# Patient Record
Sex: MALE | Race: WHITE | NOT HISPANIC OR LATINO | Employment: STUDENT
[De-identification: names, ages, dates, MRNs, and addresses within clinical notes are randomized per-mention and may not be internally consistent; named-entity substitution may affect disease eponyms.]

---
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
## 2021-04-12 ENCOUNTER — TELEPHONE (OUTPATIENT)
Dept: FAMILY MEDICINE | Facility: CLINIC | Age: 21
End: 2021-04-12

## 2021-04-12 NOTE — TELEPHONE ENCOUNTER
Patient Quality Outreach Summary      Summary:    Patient is due/failing the following:   Physical and Depression follow up     Type of outreach:    Phone, left message for patient/parent to call back.    Questions for provider review:    None                                                                                                                    Rosa Maria Mallory CMA     Chart routed to Care Team.

## 2021-04-22 ENCOUNTER — OFFICE VISIT (OUTPATIENT)
Dept: ORTHOPEDICS | Facility: CLINIC | Age: 21
End: 2021-04-22
Payer: COMMERCIAL

## 2021-04-22 VITALS — HEIGHT: 75 IN | WEIGHT: 240 LBS | BODY MASS INDEX: 29.84 KG/M2

## 2021-04-22 DIAGNOSIS — S69.91XA INJURY OF FINGER OF RIGHT HAND, INITIAL ENCOUNTER: Primary | ICD-10-CM

## 2021-04-22 ASSESSMENT — MIFFLIN-ST. JEOR: SCORE: 2184.26

## 2021-04-22 NOTE — LETTER
4/22/2021      RE: Jose Jenkins  [address] [facility] [address]       CHIEF CONCERN:   Chief Complaint   Patient presents with     Finger     R Index        HISTORY:   Jose Jenkins is a 21 year old male who was seen today on the football feild for evaluation of right index finger pain. Pt states that he was hit directly on the dorsal aspect of the right index finger MCP joint. No injury or pain prior to this. States he had acute onset of pain. No numbness or tingling. No pain in any other fingers.       PAST MEDICAL HISTORY: (Reviewed with the patient and in the [facility] medical record)  Past Medical History:   Diagnosis Date     NO ACTIVE PROBLEMS        PAST SURGICAL HISTORY: (Reviewed with the patient and in the EPIC medical record)  Past Surgical History:   Procedure Laterality Date     EXTRACTION(S) DENTAL  2019     NO HISTORY OF SURGERY         MEDICATIONS: (Reviewed with the patient and in the [facility] medical record)  Notable medications include:  Current Outpatient Medications   Medication Sig Dispense Refill     meloxicam (MOBIC) 15 MG tablet TAKE 1 TABLET BY MOUTH EVERY DAY 21 tablet 0     sertraline (ZOLOFT) 50 MG tablet Take 1.5 tablets (75 mg) by mouth daily 45 tablet 1     sertraline (ZOLOFT) 50 MG tablet TAKE 1/2 TABLET BY MOUTH EVERY MORNING FOR 5 DAYS THEN 1 TABLET DAILY THEREAFTER 30 tablet 0        ALLERGIES: (Reviewed with the patient and in the [facility] medical record)  No Known Allergies    SOCIAL HISTORY: (Reviewed with the patient and in the medical record)  --Tobacco: no  --Occupation: student  --Avocation/Sport: [employer]    FAMILY HISTORY: (Reviewed with the patient and in the medical record)  -- No family history of bleeding, clotting, or difficulty with anesthesia    REVIEW OF SYSTEMS: (Reviewed with the patient and on the health intake form)  -- A comprehensive 10 point review of systems was conducted and is negative except as noted in the HPI    EXAM:   General: Awake,  "Alert and Oriented, No acute Distress. Articulate and Interactive  Ht 1.905 m (6' 3\")   Wt 108.9 kg (240 lb)   BMI 30.00 kg/m     Right upper extremity and shoulder exam:    Skin is Warm and Well perfused, no suggestion of infection, no previous incisions    No effusion. Tenderness to palpation at the right MCP joint primarily, less so in PIP or DIP joint    Able to still flex and extend at MCP, DIP, PIP but with pain    Sensation and motor intact to axillary/radial/ulnar/median distributions; no scapular winging or dyskinesia     Brisk distal capillary refill    IMAGING:  None    ASSESSMENT:  1. 21 year old male with right index finger pain    PLAN:  1. Will obtain right hand xray for further evaluation  2. Plan to be determined after viewing xrays    Sree Davis MD  Fellow, Sports Medicine & Shoulder  [facility] Orthopaedic Surgery        Patient seen and examined with the resident.     Assesment: right index finger injury    Plan: xray, will review and results, play when able    I agree with history, physical and imaging as well as the assessment and plan as detailed by Dr. Lakhani.         Woody Ramos MD    "

## 2021-04-23 ENCOUNTER — ANCILLARY PROCEDURE (OUTPATIENT)
Dept: GENERAL RADIOLOGY | Facility: CLINIC | Age: 21
End: 2021-04-23
Attending: ORTHOPAEDIC SURGERY
Payer: COMMERCIAL

## 2021-04-23 ENCOUNTER — OFFICE VISIT (OUTPATIENT)
Dept: ORTHOPEDICS | Facility: CLINIC | Age: 21
End: 2021-04-23
Payer: COMMERCIAL

## 2021-04-23 VITALS — BODY MASS INDEX: 29.84 KG/M2 | HEIGHT: 75 IN | WEIGHT: 240 LBS

## 2021-04-23 DIAGNOSIS — S69.91XA INJURY OF FINGER OF RIGHT HAND, INITIAL ENCOUNTER: Primary | ICD-10-CM

## 2021-04-23 DIAGNOSIS — S69.91XA INJURY OF FINGER OF RIGHT HAND, INITIAL ENCOUNTER: ICD-10-CM

## 2021-04-23 PROCEDURE — 99203 OFFICE O/P NEW LOW 30 MIN: CPT | Mod: 25 | Performed by: FAMILY MEDICINE

## 2021-04-23 PROCEDURE — 73130 X-RAY EXAM OF HAND: CPT | Mod: RT | Performed by: RADIOLOGY

## 2021-04-23 PROCEDURE — 29075 APPL CST ELBW FNGR SHORT ARM: CPT | Mod: RT | Performed by: FAMILY MEDICINE

## 2021-04-23 ASSESSMENT — MIFFLIN-ST. JEOR: SCORE: 2184.26

## 2021-04-23 NOTE — PROGRESS NOTES
Patient seen and examined with the resident.     Assesment: right index finger injury    Plan: xray, will review and results, play when able    I agree with history, physical and imaging as well as the assessment and plan as detailed by Dr. Lakhani.

## 2021-04-23 NOTE — LETTER
"    4/23/2021         RE: Jose Jenkins  [address] [facility] [address]        Dear Colleague,    Thank you for referring your patient, Jose Jenkins, to the [facility]TH[facility]. Please see a copy of my visit note below.      [facility]  SPORTS & ORTHOPEDIC CLINIC VISIT     Apr 23, 2021        ASSESSMENT & PLAN    21-year-old with spiral fracture of proximal phalanx of index finger    Reviewed imaging and assessment with patient in detail  Patient was placed in ulnar gutter cast.  We will follow-up at [facility] as previously planned    Kelvin Diaz MD  [facility]THOP[facility]    -----  Chief Complaint   Patient presents with     Consult     right hand       SUBJECTIVE  Jose Jenkins is a/an 21 year old male who is seen for follow up of from right index finger fracture. He was seen by Dr. Ramos in the athletic training room at [facility].     The patient is seen by themselves.    Date of injury: 4/22/2021  Date of Last Visit: 4/22/2021   Symptoms: no change  Worsened by: movement  Better with: nothing.  Treatment to date: no treatment tried to date  Associated symptoms: swelling and pain      REVIEW OF SYSTEMS:    See HPI     OBJECTIVE:  Ht 1.905 m (6' 3\")   Wt 108.9 kg (240 lb)   BMI 30.00 kg/m       Alert, pleasant, no distress  Right index finger: Significant soft tissue swelling.  No significant deformity.  Limited ROM due to swelling.  Sensation intact.  Strength intact.    RADIOLOGY:    Reviewed x-rays of right index finger.  Per radiology report:IMPRESSION: Acute extra-articular slightly foreshortened index finger  proximal phalangeal spiral fracture.           Cast/splint application    Date/Time: 4/23/2021 4:37 PM  Performed by: Asya Bonilla ATC  Authorized by: Kelvin Diaz MD     Consent:     Consent obtained:  Verbal    Consent given by:  Patient ( and ATC GA )    Risks " discussed:  Discoloration, numbness, pain and swelling    Alternatives discussed:  No treatment  Pre-procedure details:     Sensation:  Normal  Procedure details:     Laterality:  Right    Location:  Finger    Finger:  R index finger    Cast type:  Ulnar gutter (4 finger in intrinsic plus)    Supplies:  Fiberglass  Post-procedure details:     Pain:  Unchanged    Pain level:  0/10    Sensation:  Normal    Patient tolerance of procedure:  Tolerated well, no immediate complications    Patient provided with cast or splint care instructions: Yes        Asya Bonilla, ATC

## 2021-04-23 NOTE — PROGRESS NOTES
"  [facility] SURG[facility]ENTER  SPORTS & ORTHOPEDIC CLINIC VISIT     Apr 23, 2021        ASSESSMENT & PLAN    21-year-old with spiral fracture of proximal phalanx of index finger    Reviewed imaging and assessment with patient in detail  Patient was placed in ulnar gutter cast.  We will follow-up at [facility] as previously planned    Kelvin Diaz MD  [facility] ORTHOPEDIC [facility]    -----  Chief Complaint   Patient presents with     Consult     right hand       SUBJECTIVE  Jose Jenkins is a/an 21 year old male who is seen for follow up of from right index finger fracture. He was seen by Dr. Ramos in the athletic training room at [facility].     The patient is seen by themselves.    Date of injury: 4/22/2021  Date of Last Visit: 4/22/2021   Symptoms: no change  Worsened by: movement  Better with: nothing.  Treatment to date: no treatment tried to date  Associated symptoms: swelling and pain      REVIEW OF SYSTEMS:    See HPI     OBJECTIVE:  Ht 1.905 m (6' 3\")   Wt 108.9 kg (240 lb)   BMI 30.00 kg/m       Alert, pleasant, no distress  Right index finger: Significant soft tissue swelling.  No significant deformity.  Limited ROM due to swelling.  Sensation intact.  Strength intact.    RADIOLOGY:    Reviewed x-rays of right index finger.  Per radiology report:IMPRESSION: Acute extra-articular slightly foreshortened index finger  proximal phalangeal spiral fracture.           Cast/splint application    Date/Time: 4/23/2021 4:37 PM  Performed by: Asya Bonilla ATC  Authorized by: Kelvin Diaz MD     Consent:     Consent obtained:  Verbal    Consent given by:  Patient ( and ATC GA )    Risks discussed:  Discoloration, numbness, pain and swelling    Alternatives discussed:  No treatment  Pre-procedure details:     Sensation:  Normal  Procedure details:     Laterality:  Right    Location:  Finger    Finger:  R index finger    Cast type:  Ulnar gutter (4 finger in " intrinsic plus)    Supplies:  Fiberglass  Post-procedure details:     Pain:  Unchanged    Pain level:  0/10    Sensation:  Normal    Patient tolerance of procedure:  Tolerated well, no immediate complications    Patient provided with cast or splint care instructions: Yes        Asya Bonilla, ATC

## 2021-04-23 NOTE — PROGRESS NOTES
CHIEF CONCERN:   Chief Complaint   Patient presents with     Finger     R Index        HISTORY:   Jose Jenkins is a 21 year old male who was seen today on the football feild for evaluation of right index finger pain. Pt states that he was hit directly on the dorsal aspect of the right index finger MCP joint. No injury or pain prior to this. States he had acute onset of pain. No numbness or tingling. No pain in any other fingers.       PAST MEDICAL HISTORY: (Reviewed with the patient and in the EPIC medical record)  Past Medical History:   Diagnosis Date     NO ACTIVE PROBLEMS        PAST SURGICAL HISTORY: (Reviewed with the patient and in the EPIC medical record)  Past Surgical History:   Procedure Laterality Date     EXTRACTION(S) DENTAL  2019     NO HISTORY OF SURGERY         MEDICATIONS: (Reviewed with the patient and in the EPIC medical record)  Notable medications include:  Current Outpatient Medications   Medication Sig Dispense Refill     meloxicam (MOBIC) 15 MG tablet TAKE 1 TABLET BY MOUTH EVERY DAY 21 tablet 0     sertraline (ZOLOFT) 50 MG tablet Take 1.5 tablets (75 mg) by mouth daily 45 tablet 1     sertraline (ZOLOFT) 50 MG tablet TAKE 1/2 TABLET BY MOUTH EVERY MORNING FOR 5 DAYS THEN 1 TABLET DAILY THEREAFTER 30 tablet 0        ALLERGIES: (Reviewed with the patient and in the EPIC medical record)  No Known Allergies    SOCIAL HISTORY: (Reviewed with the patient and in the medical record)  --Tobacco: no  --Occupation: student  --Avocation/Sport: [employer]    FAMILY HISTORY: (Reviewed with the patient and in the medical record)  -- No family history of bleeding, clotting, or difficulty with anesthesia    REVIEW OF SYSTEMS: (Reviewed with the patient and on the health intake form)  -- A comprehensive 10 point review of systems was conducted and is negative except as noted in the HPI    EXAM:   General: Awake, Alert and Oriented, No acute Distress. Articulate and Interactive  Ht 1.905 m (6'  "3\")   Wt 108.9 kg (240 lb)   BMI 30.00 kg/m     Right upper extremity and shoulder exam:    Skin is Warm and Well perfused, no suggestion of infection, no previous incisions    No effusion. Tenderness to palpation at the right MCP joint primarily, less so in PIP or DIP joint    Able to still flex and extend at MCP, DIP, PIP but with pain    Sensation and motor intact to axillary/radial/ulnar/median distributions; no scapular winging or dyskinesia     Brisk distal capillary refill    IMAGING:  None    ASSESSMENT:  1. 21 year old male with right index finger pain    PLAN:  1. Will obtain right hand xray for further evaluation  2. Plan to be determined after viewing xrays    Sree Davis MD  Fellow, Sports Medicine & Shoulder  [facility]      "

## 2021-04-27 ENCOUNTER — OFFICE VISIT (OUTPATIENT)
Dept: ORTHOPEDICS | Facility: CLINIC | Age: 21
End: 2021-04-27
Payer: COMMERCIAL

## 2021-04-27 VITALS — BODY MASS INDEX: 29.84 KG/M2 | HEIGHT: 75 IN | WEIGHT: 240 LBS

## 2021-04-27 DIAGNOSIS — S62.640A CLOSED NONDISPLACED FRACTURE OF PROXIMAL PHALANX OF RIGHT INDEX FINGER, INITIAL ENCOUNTER: Primary | ICD-10-CM

## 2021-04-27 ASSESSMENT — MIFFLIN-ST. JEOR: SCORE: 2184.26

## 2021-04-27 NOTE — LETTER
"  4/27/2021      RE: Jose Jenkins  [address] [facility] [address]       CHIEF CONCERN:   Chief Complaint   Patient presents with     Finger     Broken Index Finger        HISTORY:   Jose Jenkins is a 21 year old male who was seen today in the athletic training room of the football facility for follow up evaluation of right index finger pain. For review, \"Pt states that he was hit directly on the dorsal aspect of the right index finger MCP joint. No injury or pain prior to this. States he had acute onset of pain. No numbness or tingling. No pain in any other fingers.\"      PAST MEDICAL HISTORY: (Reviewed with the patient and in the [facility] medical record)  Past Medical History:   Diagnosis Date     NO ACTIVE PROBLEMS        PAST SURGICAL HISTORY: (Reviewed with the patient and in the EPIC medical record)  Past Surgical History:   Procedure Laterality Date     EXTRACTION(S) DENTAL  2019     NO HISTORY OF SURGERY         MEDICATIONS: (Reviewed with the patient and in the EPIC medical record)  Notable medications include:  Current Outpatient Medications   Medication Sig Dispense Refill     meloxicam (MOBIC) 15 MG tablet TAKE 1 TABLET BY MOUTH EVERY DAY 21 tablet 0     sertraline (ZOLOFT) 50 MG tablet Take 1.5 tablets (75 mg) by mouth daily 45 tablet 1     sertraline (ZOLOFT) 50 MG tablet TAKE 1/2 TABLET BY MOUTH EVERY MORNING FOR 5 DAYS THEN 1 TABLET DAILY THEREAFTER 30 tablet 0        ALLERGIES: (Reviewed with the patient and in the [facility] medical record)  No Known Allergies    SOCIAL HISTORY: (Reviewed with the patient and in the medical record)  --Tobacco: no  --Occupation: student  --Avocation/Sport: [employer]    FAMILY HISTORY: (Reviewed with the patient and in the medical record)  -- No family history of bleeding, clotting, or difficulty with anesthesia    REVIEW OF SYSTEMS: (Reviewed with the patient and on the health intake form)  -- A comprehensive 10 point review of systems was conducted " "and is negative except as noted in the HPI    EXAM:   General: Awake, Alert and Oriented, No acute Distress. Articulate and Interactive  Ht 1.905 m (6' 3\")   Wt 108.9 kg (240 lb)   BMI 30.00 kg/m     Right upper extremity and shoulder exam:    Resting in cast    Skin is Warm and Well perfused, no suggestion of infection, no previous incisions    No effusion.     Sensation and motor intact to fingertips    Brisk distal capillary refill    IMAGING:  None    ASSESSMENT:  21 year old male with right extra-articular index finger proximal phalangeal spiral fracture    PLAN:  1. Nondisplaced; attempt non op management  2. Continue cast; will change cast next week followed by repeat xray  3. Continue pain and swelling control per athletic training staff    Sree Davis MD  Fellow, Sports Medicine & Shoulder  [facility] Orthopaedic Surgery        Attestation signed by Woody Ramos MD at 4/28/2021  8:52 AM:  Agree with the plan    Patient seen and examined with the resident.     Assesment: index proximal phalynx fracture    Plan: patient was place into a cast last week   Continue casting   Clear for football this week   Will re-xray and replace cast next week   Total casting time ~4 weeks    I agree with history, physical and imaging as well as the assessment and plan as detailed by Dr. Davis.         Woody Ramos MD    "

## 2021-04-27 NOTE — PROGRESS NOTES
"CHIEF CONCERN:   Chief Complaint   Patient presents with     Finger     Broken Index Finger        HISTORY:   Jose Jenkins is a 21 year old male who was seen today in the athletic training room of the football facility for follow up evaluation of right index finger pain. For review, \"Pt states that he was hit directly on the dorsal aspect of the right index finger MCP joint. No injury or pain prior to this. States he had acute onset of pain. No numbness or tingling. No pain in any other fingers.\"      PAST MEDICAL HISTORY: (Reviewed with the patient and in the [facility] medical record)  Past Medical History:   Diagnosis Date     NO ACTIVE PROBLEMS        PAST SURGICAL HISTORY: (Reviewed with the patient and in the EPIC medical record)  Past Surgical History:   Procedure Laterality Date     EXTRACTION(S) DENTAL  2019     NO HISTORY OF SURGERY         MEDICATIONS: (Reviewed with the patient and in the EPIC medical record)  Notable medications include:  Current Outpatient Medications   Medication Sig Dispense Refill     meloxicam (MOBIC) 15 MG tablet TAKE 1 TABLET BY MOUTH EVERY DAY 21 tablet 0     sertraline (ZOLOFT) 50 MG tablet Take 1.5 tablets (75 mg) by mouth daily 45 tablet 1     sertraline (ZOLOFT) 50 MG tablet TAKE 1/2 TABLET BY MOUTH EVERY MORNING FOR 5 DAYS THEN 1 TABLET DAILY THEREAFTER 30 tablet 0        ALLERGIES: (Reviewed with the patient and in the EPIC medical record)  No Known Allergies    SOCIAL HISTORY: (Reviewed with the patient and in the medical record)  --Tobacco: no  --Occupation: student  --Avocation/Sport: [employer]    FAMILY HISTORY: (Reviewed with the patient and in the medical record)  -- No family history of bleeding, clotting, or difficulty with anesthesia    REVIEW OF SYSTEMS: (Reviewed with the patient and on the health intake form)  -- A comprehensive 10 point review of systems was conducted and is negative except as noted in the HPI    EXAM:   General: Awake, Alert and " "Oriented, No acute Distress. Articulate and Interactive  Ht 1.905 m (6' 3\")   Wt 108.9 kg (240 lb)   BMI 30.00 kg/m     Right upper extremity and shoulder exam:    Resting in cast    Skin is Warm and Well perfused, no suggestion of infection, no previous incisions    No effusion.     Sensation and motor intact to fingertips    Brisk distal capillary refill    IMAGING:  None    ASSESSMENT:  21 year old male with right extra-articular index finger proximal phalangeal spiral fracture    PLAN:  1. Nondisplaced; attempt non op management  2. Continue cast; will change cast next week followed by repeat xray  3. Continue pain and swelling control per athletic training staff    Sree Davis MD  Fellow, Sports Medicine & Shoulder  [facility]      "

## 2021-04-28 NOTE — PROGRESS NOTES
Patient seen and examined with the resident.     Assesment: index proximal phalynx fracture    Plan: patient was place into a cast last week   Continue casting   Clear for football this week   Will re-xray and replace cast next week   Total casting time ~4 weeks    I agree with history, physical and imaging as well as the assessment and plan as detailed by Dr. Davis.

## 2021-04-30 ENCOUNTER — ALLIED HEALTH/NURSE VISIT (OUTPATIENT)
Dept: ORTHOPEDICS | Facility: CLINIC | Age: 21
End: 2021-04-30
Payer: COMMERCIAL

## 2021-04-30 DIAGNOSIS — Z09 FRACTURE FOLLOW-UP: Primary | ICD-10-CM

## 2021-04-30 PROCEDURE — 29075 APPL CST ELBW FNGR SHORT ARM: CPT | Mod: RT

## 2021-04-30 NOTE — PROGRESS NOTES
Jose came into clinic today for a cast change. He has a right index finger proximal phalanx fx, about one week old. He is in a 4 finger ulnar gutter short arm. He broke he cast along his wrist due to blocking at football practice. Upon removal of his cast, I inspected his skin. There was no sign of wounds or rashes. He did have some bruising present that wasn't visible last time, but his swelling is significantly less. I appllied a new 4 finger ulnar gutter short arm cast. He will follow-up next week with  with Xr and new cast.     Cast/splint application    Date/Time: 4/30/2021 1:55 PM  Performed by: Asya Bonilla ATC  Authorized by: Woody Ramos MD     Consent:     Consent obtained:  Verbal    Consent given by:  Patient    Risks discussed:  Discoloration, numbness, swelling and pain    Alternatives discussed:  No treatment  Pre-procedure details:     Sensation:  Normal  Procedure details:     Laterality:  Right    Location:  Finger    Finger:  R index finger    Cast type:  Ulnar gutter (4 finger instrinsic plus)    Supplies:  Fiberglass  Post-procedure details:     Pain:  Unchanged    Pain level:  1/10    Sensation:  Normal    Patient tolerance of procedure:  Tolerated well, no immediate complications    Patient provided with cast or splint care instructions: Yes      Asya Bonilla ATC

## 2021-05-05 ENCOUNTER — APPOINTMENT (OUTPATIENT)
Dept: ORTHOPEDICS | Facility: CLINIC | Age: 21
End: 2021-05-05
Payer: COMMERCIAL

## 2021-05-05 ENCOUNTER — ANCILLARY PROCEDURE (OUTPATIENT)
Dept: GENERAL RADIOLOGY | Facility: CLINIC | Age: 21
End: 2021-05-05
Payer: COMMERCIAL

## 2021-05-05 DIAGNOSIS — Z09 FRACTURE FOLLOW-UP: ICD-10-CM

## 2021-05-05 DIAGNOSIS — Z09 FRACTURE FOLLOW-UP: Primary | ICD-10-CM

## 2021-05-05 PROCEDURE — 73130 X-RAY EXAM OF HAND: CPT | Mod: RT | Performed by: RADIOLOGY

## 2021-05-05 NOTE — TELEPHONE ENCOUNTER
RECORDS RECEIVED FROM: Right index finger proximal phalanx fracture   DATE RECEIVED: May 6, 2021     NOTES STATUS DETAILS   OFFICE NOTE from referring provider INTERNAL  Woody Ramos MD      OFFICE NOTE from other specialist Internal  Kelvin Diaz MD      DISCHARGE SUMMARY from hospital N/A    DISCHARGE REPORT from the ER N/A    OPERATIVE REPORT N/A    MEDICATION LIST Internal    IMPLANT RECORD/STICKER N/A    LABS     CBC/DIFF N/A    CULTURES N/A    INJECTIONS DONE IN RADIOLOGY N/A    MRI N/A    CT SCAN N/A    XRAYS (IMAGES & REPORTS) Internal    TUMOR     PATHOLOGY  Slides & report N/A      05/05/21   3:11 PM   COMPLETE  Krista Mercer CMA

## 2021-05-06 ENCOUNTER — PREP FOR PROCEDURE (OUTPATIENT)
Dept: ORTHOPEDICS | Facility: CLINIC | Age: 21
End: 2021-05-06

## 2021-05-06 ENCOUNTER — OFFICE VISIT (OUTPATIENT)
Dept: ORTHOPEDICS | Facility: CLINIC | Age: 21
End: 2021-05-06
Payer: COMMERCIAL

## 2021-05-06 ENCOUNTER — PRE VISIT (OUTPATIENT)
Dept: ORTHOPEDICS | Facility: CLINIC | Age: 21
End: 2021-05-06

## 2021-05-06 DIAGNOSIS — S62.618P: Primary | ICD-10-CM

## 2021-05-06 DIAGNOSIS — S62.618A: Primary | ICD-10-CM

## 2021-05-06 PROCEDURE — 29085 APPL CAST HAND&LWR FOREARM: CPT | Mod: RT | Performed by: ORTHOPAEDIC SURGERY

## 2021-05-06 PROCEDURE — 99212 OFFICE O/P EST SF 10 MIN: CPT | Mod: 25 | Performed by: ORTHOPAEDIC SURGERY

## 2021-05-06 NOTE — LETTER
5/6/2021         RE: Jose Jenkins  [address] [address] [facility] [address] [facility] [address]        Dear Colleague,    Thank you for referring your patient, Jose Jenkins, to the [facility] [place]. Please see a copy of my visit note below.    ORTHOPEDIC HAND SURGERY CONSULTATION    REFERRING PROVIDER: Referred Self    CHIEF COMPLAINT: Right index finger proximal phalanx fracture    HISTORY OF PRESENT ILLNESS:  Jose is 21 year old, RHD, male who sustained a right IF proximal phalanx fracture 4/23 after hitting his hand against a teammates helmet. 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    He was placed in a cast, but returned to play. Subsequent XR then revealed displacement of the fracture and he was referred to Dr. Wyatt for further evaluation/management.    No prior right hand injuries or surgeries.    PAST MEDICAL HISTORY:  Past Medical History:   Diagnosis Date     NO ACTIVE PROBLEMS        MEDICATIONS:  Current Outpatient Medications   Medication Sig     meloxicam (MOBIC) 15 MG tablet TAKE 1 TABLET BY MOUTH EVERY DAY     sertraline (ZOLOFT) 50 MG tablet Take 1.5 tablets (75 mg) by mouth daily     sertraline (ZOLOFT) 50 MG tablet TAKE 1/2 TABLET BY MOUTH EVERY MORNING FOR 5 DAYS THEN 1 TABLET DAILY THEREAFTER     No current facility-administered medications for this visit.        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     See intake form completed by patient      PHYSICAL EXAM:  There were no vitals taken for this visit.   Gen: awake, alert, no acute distress  Resp: NLB on RA  CV: Skin wwp    RIGHT Upper Extremity  - Skin intact, ecchymosis on   - Palpable radial pulse  - Fires epl, fpl, io  - SILT median, radial, ulnar nerve distributions  - Supination of index finger in comparison to other digits. Slight ulnar deviation of index finger compared to other digits.    IMAGING:  All imaging independently reviewed  Right Hand XR 5/5/2021  - long spiral fracture of index proximal phalanx with 5 mm of displacement.    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 Now with interval displacement.    We discussed the risk, benefits, possible complications of both nonsurgical and surgical treatment options.  We discussed that he is showing rotational deformity through his right index finger at this point and that surgical treatment would be able to help better align his index finger.  We discussed expected postoperative rehabilitation and that he would likely be able to return to play in 4 weeks.  However, we would ultimately leave this decision up to Dr. Hoover.  All of his questions were answered today and he is electing to proceed with right index finger proximal phalanx open reduction internal fixation.    PLAN:  - Plan for ORIF right IF proximal phalanx next Wednesday with Dr. Hoover  - Preop H&P  - Preop Covid  - Follow up day of surgery  - short arm cast replaced today. Keep c/d/i until OR next week.    The patient was seen and discussed with Dr. Wyatt.    Jose L Garland MD  PGY-4  Orthopaedic Surgery    I have personally examined this patient and have reviewed the clinical presentation and  progress note with the resident. I agree with the treatment plan as outlined. The plan was formulated with the resident on the day of the resident's dictation.   Padmini Wyatt MD   Hand and Upper Extremity Specialist  [facility]      Cast/splint application    Date/Time: 5/6/2021 10:05 AM  Performed by: Asya Bonilla ATC  Authorized by: Padmini Wyatt MD     Consent:     Consent obtained:  Verbal    Consent given by:  Patient    Risks discussed:  Discoloration, numbness, pain and swelling    Alternatives discussed:  No treatment  Pre-procedure details:     Sensation:  Normal  Procedure details:     Laterality:  Right    Location:  Finger    Finger:  R index finger    Splint type:  Ulnar gutter (4 finger in intrinsic plus)  Post-procedure details:     Pain:  Unchanged    Pain level:  2/10    Sensation:  Normal    Patient tolerance of procedure:  Tolerated well, no immediate complications    Patient provided with cast or splint care instructions: Yes        Asya Bonilla ATC

## 2021-05-06 NOTE — NURSING NOTE
Teaching Flowsheet   Relevant Diagnosis: Right index finger proximal phalanx fracture  Teaching Topic: Right index finger closed reduction percutaneous pinning -VS- open reduction internal fixation    Patient seen in clinic with Dr Padmini Wyatt on 5-6-21.    Surgery at [facility] under Regional block anesthesia with Dr Gary Hoover on Wednesday 5-12-21.    Dr Hoover had a cancellation of a procedure from a different patient for 5-12-21 so Dr Wyatt is requesting this OR  time to be used for this patient instead.    Patient states his football  assisted him in getting a pre-op exam with Dr Hull on Friday 5-7-21, and he will have a Covid-19 test on Monday 5-10-21.    Person(s) involved in teaching:   Patient     Motivation Level:  Asks Questions: Yes  Eager to Learn: Yes  Cooperative: Yes  Receptive (willing/able to accept information): Yes  Any cultural factors/Yarsanism beliefs that may influence understanding or compliance? No    Patient demonstrates understanding of the following:  Reason for the appointment, diagnosis and treatment plan: Yes  Knowledge of proper use of medications and conditions for which they are ordered (with special attention to potential side effects or drug interactions): Yes  Which situations necessitate calling provider and whom to contact: Yes    Teaching Concerns Addressed:   Proper use and care of  (medical equip, care aids, etc.): Yes  Nutritional needs and diet plan: Yes  Pain management techniques: Yes  Wound Care: Yes  How and/when to access community resources: Yes     Instructional Materials Used/Given:   Preoperative teaching packet, antibacterial soap  Guadalupe Locke RN

## 2021-05-06 NOTE — PROGRESS NOTES
ORTHOPEDIC HAND SURGERY CONSULTATION    REFERRING PROVIDER: Referred Self    CHIEF COMPLAINT: Right index finger proximal phalanx fracture    HISTORY OF PRESENT ILLNESS:  Jose is 21 year old, RHD, male who sustained a right IF proximal phalanx fracture 4/23 after hitting his hand against a teammates helmet. 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    He was placed in a cast, but returned to play. Subsequent XR then revealed displacement of the fracture and he was referred to Dr. Wyatt for further evaluation/management.    No prior right hand injuries or surgeries.    PAST MEDICAL HISTORY:  Past Medical History:   Diagnosis Date     NO ACTIVE PROBLEMS        MEDICATIONS:  Current Outpatient Medications   Medication Sig     meloxicam (MOBIC) 15 MG tablet TAKE 1 TABLET BY MOUTH EVERY DAY     sertraline (ZOLOFT) 50 MG tablet Take 1.5 tablets (75 mg) by mouth daily     sertraline (ZOLOFT) 50 MG tablet TAKE 1/2 TABLET BY MOUTH EVERY MORNING FOR 5 DAYS THEN 1 TABLET DAILY THEREAFTER     No current facility-administered medications for this visit.        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     See intake form completed by patient      PHYSICAL EXAM:  There were no vitals taken for this visit.    Gen: awake, alert, no acute distress  Resp: NLB on RA  CV: Skin wwp    RIGHT Upper Extremity  - Skin intact, ecchymosis on   - Palpable radial pulse  - Fires epl, fpl, io  - SILT median, radial, ulnar nerve distributions  - Supination of index finger in comparison to other digits. Slight ulnar deviation of index finger compared to other digits.    IMAGING:  All imaging independently reviewed  Right Hand XR 5/5/2021  - long spiral fracture of index proximal phalanx with 5 mm of displacement.    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 Now with interval displacement.    We discussed the risk, benefits, possible complications of both nonsurgical and surgical treatment options.  We discussed that he is showing rotational deformity through his right index finger at this point and that surgical treatment would be able to help better align his index finger.  We discussed expected postoperative rehabilitation and that he would likely be able to return to play in 4 weeks.  However, we would ultimately leave this decision up to Dr. Hoover.  All of his questions were answered today and he is electing to proceed with right index finger proximal phalanx open reduction internal fixation.    PLAN:  - Plan for ORIF right IF proximal phalanx next Wednesday with Dr. Hoover  - Preop H&P  - Preop Covid  - Follow up day of surgery  - short arm cast replaced today. Keep c/d/i until OR next week.    The patient was seen and discussed with Dr. Wyatt.    Jose L Garland MD  PGY-4  Orthopaedic Surgery    I have personally examined this patient and have reviewed the clinical presentation and progress note with the resident. I agree with the treatment plan as outlined. The plan was formulated with the resident on the day of the resident's dictation.   Padmini Wyatt MD   Hand and Upper Extremity Specialist  [facility]      Cast/splint  application    Date/Time: 5/6/2021 10:05 AM  Performed by: Asya Bonilla ATC  Authorized by: Padmini Wyatt MD     Consent:     Consent obtained:  Verbal    Consent given by:  Patient    Risks discussed:  Discoloration, numbness, pain and swelling    Alternatives discussed:  No treatment  Pre-procedure details:     Sensation:  Normal  Procedure details:     Laterality:  Right    Location:  Finger    Finger:  R index finger    Splint type:  Ulnar gutter (4 finger in intrinsic plus)  Post-procedure details:     Pain:  Unchanged    Pain level:  2/10    Sensation:  Normal    Patient tolerance of procedure:  Tolerated well, no immediate complications    Patient provided with cast or splint care instructions: Yes        Asya Bonilla, ATC

## 2021-05-06 NOTE — NURSING NOTE
Reason For Visit:   Chief Complaint   Patient presents with     Consult For     Right index finger fracture DOI 4/22/21       Primary MD: Keri Maldonado    ?  No    Age: 21 year old    Work status?  Student.  Date of injury: 4/22/21  Type of injury: Football injury - finger fracture.  Date of surgery: NA  Type of surgery: NA.  Smoker: No  Request smoking cessation information: No      There were no vitals taken for this visit.      Pain Assessment  Patient Currently in Pain: Yes  0-10 Pain Scale: 1  Primary Pain Location: Finger (Comment which one)(Right index finger)               QuickDASH Assessment  No flowsheet data found.       No Known Allergies    Criss Mars, ATC

## 2021-05-07 ENCOUNTER — OFFICE VISIT (OUTPATIENT)
Dept: FAMILY MEDICINE | Facility: CLINIC | Age: 21
End: 2021-05-07
Payer: COMMERCIAL

## 2021-05-07 VITALS
HEIGHT: 75 IN | DIASTOLIC BLOOD PRESSURE: 84 MMHG | SYSTOLIC BLOOD PRESSURE: 138 MMHG | HEART RATE: 76 BPM | BODY MASS INDEX: 29.84 KG/M2 | WEIGHT: 240 LBS

## 2021-05-07 DIAGNOSIS — F41.9 ANXIETY: ICD-10-CM

## 2021-05-07 DIAGNOSIS — S62.611D CLOSED DISPLACED FRACTURE OF PROXIMAL PHALANX OF LEFT INDEX FINGER WITH ROUTINE HEALING, SUBSEQUENT ENCOUNTER: ICD-10-CM

## 2021-05-07 DIAGNOSIS — Z01.818 PRE-OP EXAM: Primary | ICD-10-CM

## 2021-05-07 ASSESSMENT — MIFFLIN-ST. JEOR: SCORE: 2184.26

## 2021-05-07 NOTE — LETTER
"  5/7/2021      RE: Jose Jenkins  [address] [facility] [address]       NAME: Jose Jenkins      AGE: 21 year old      SEX: male  Chief Complaint/Reason for Procedure:  Pre op for finger surgery  Indications: right index phalanx fracture  Surgeon:  Sneha/Augie Gonzalez Date of Surgery: 05/12/21  Location: [facility] [place]    HPI: 22 yo male with right index finger displaced phalanx fracture  Requiring surgery    PAST HISTORY  Past Surgical History:   Procedure Laterality Date     EXTRACTION(S) DENTAL  2019     NO HISTORY OF SURGERY         Past Medical History:   Diagnosis Date     NO ACTIVE PROBLEMS        MEDICATIONS:  Current Outpatient Medications   Medication Sig Dispense Refill     meloxicam (MOBIC) 15 MG tablet TAKE 1 TABLET BY MOUTH EVERY DAY 21 tablet 0     sertraline (ZOLOFT) 50 MG tablet Take 1.5 tablets (75 mg) by mouth daily 45 tablet 1     sertraline (ZOLOFT) 50 MG tablet TAKE 1/2 TABLET BY MOUTH EVERY MORNING FOR 5 DAYS THEN 1 TABLET DAILY THEREAFTER 30 tablet 0       ALLERGIES  Patient has no known allergies.    SOCIAL HISTORY  Social History     Tobacco Use     Smoking status: Never Smoker     Smokeless tobacco: Never Used   Substance Use Topics     Alcohol use: No     Drug use: No       FAMILY HISTORY  Family History   Problem Relation Age of Onset     Hypertension No family hx of      Breast Cancer No family hx of      Colon Cancer No family hx of      Prostate Cancer No family hx of      Diabetes No family hx of        Family History of Anesthesia Reaction: No  Family History of Bleeding Disorder:  No    REVIEW OF SYSTEMS  Constitutional, HEENT, cardiovascular, pulmonary, gi and gu systems are negative, except as otherwise noted.    PHYSICAL EXAM  /84   Pulse 76   Ht 1.905 m (6' 3\")   Wt 108.9 kg (240 lb)   BMI 30.00 kg/m    General Appearance: Normal  Skin:  Normal  Head:  Normal  Eyes: Normal  Ears: Normal  Nose: Normal  Mouth/Teeth: Normal  Throat: Normal  Neck: " Normal    Chest/Lungs: Normal  Heart/Vascular: Normal  Abdomen: Normal    Skeletal: Normal  Lymphoid: Normal  Blood Vessels: Normal  Neuromuscular: Normal  Other: Normal    Assessment  22 yo male with pre op physical for right index finger fracture phalanx    PLAN  This patient has been examined by me this day and has been found to be an acceptable candidate for surgery with apppropriate anesthesia.  Hunter Hull MD   5/7/2021              We also had a follow up discussion of changing his sertraline to prozac for control and treatment for his anxiety and depression, that is not improved overall yet.  No HI or SI  States that he is continuing to followup with Dr Shell  And will plan to followup with me in 1 month at my [place] office outside of athletic medicine here  Discussed and started prozac 20mg daily  Dr Kurtis Hull MD

## 2021-05-07 NOTE — PROGRESS NOTES
"NAME: Jose Jenkins      AGE: 21 year old      SEX: male  Chief Complaint/Reason for Procedure:  Pre op for finger surgery  Indications: right index phalanx fracture  Surgeon:  Sneha/Augie Gonzalez Date of Surgery: 05/12/21  Location: [facility] [place]    HPI: 20 yo male with right index finger displaced phalanx fracture  Requiring surgery    PAST HISTORY  Past Surgical History:   Procedure Laterality Date     EXTRACTION(S) DENTAL  2019     NO HISTORY OF SURGERY         Past Medical History:   Diagnosis Date     NO ACTIVE PROBLEMS        MEDICATIONS:  Current Outpatient Medications   Medication Sig Dispense Refill     meloxicam (MOBIC) 15 MG tablet TAKE 1 TABLET BY MOUTH EVERY DAY 21 tablet 0     sertraline (ZOLOFT) 50 MG tablet Take 1.5 tablets (75 mg) by mouth daily 45 tablet 1     sertraline (ZOLOFT) 50 MG tablet TAKE 1/2 TABLET BY MOUTH EVERY MORNING FOR 5 DAYS THEN 1 TABLET DAILY THEREAFTER 30 tablet 0       ALLERGIES  Patient has no known allergies.    SOCIAL HISTORY  Social History     Tobacco Use     Smoking status: Never Smoker     Smokeless tobacco: Never Used   Substance Use Topics     Alcohol use: No     Drug use: No       FAMILY HISTORY  Family History   Problem Relation Age of Onset     Hypertension No family hx of      Breast Cancer No family hx of      Colon Cancer No family hx of      Prostate Cancer No family hx of      Diabetes No family hx of        Family History of Anesthesia Reaction: No  Family History of Bleeding Disorder:  No    REVIEW OF SYSTEMS  Constitutional, HEENT, cardiovascular, pulmonary, gi and gu systems are negative, except as otherwise noted.    PHYSICAL EXAM  /84   Pulse 76   Ht 1.905 m (6' 3\")   Wt 108.9 kg (240 lb)   BMI 30.00 kg/m    General Appearance: Normal  Skin:  Normal  Head:  Normal  Eyes: Normal  Ears: Normal  Nose: Normal  Mouth/Teeth: Normal  Throat: Normal  Neck: Normal    Chest/Lungs: Normal  Heart/Vascular: Normal  Abdomen: Normal    Skeletal: Normal  Lymphoid: " Normal  Blood Vessels: Normal  Neuromuscular: Normal  Other: Normal    Assessment  20 yo male with pre op physical for right index finger fracture phalanx    PLAN  This patient has been examined by me this day and has been found to be an acceptable candidate for surgery with apppropriate anesthesia.  Hunter Hull MD   5/7/2021

## 2021-05-07 NOTE — H&P (VIEW-ONLY)
"NAME: Jose Jenkins      AGE: 21 year old      SEX: male  Chief Complaint/Reason for Procedure:  Pre op for finger surgery  Indications: right index phalanx fracture  Surgeon:  Sneha/Augie Gonzalez Date of Surgery: 05/12/21  [place] [facility] [place]    HPI: 22 yo male with right index finger displaced phalanx fracture  Requiring surgery    PAST HISTORY  Past Surgical History:   Procedure Laterality Date     EXTRACTION(S) DENTAL  2019     NO HISTORY OF SURGERY         Past Medical History:   Diagnosis Date     NO ACTIVE PROBLEMS        MEDICATIONS:  Current Outpatient Medications   Medication Sig Dispense Refill     meloxicam (MOBIC) 15 MG tablet TAKE 1 TABLET BY MOUTH EVERY DAY 21 tablet 0     sertraline (ZOLOFT) 50 MG tablet Take 1.5 tablets (75 mg) by mouth daily 45 tablet 1     sertraline (ZOLOFT) 50 MG tablet TAKE 1/2 TABLET BY MOUTH EVERY MORNING FOR 5 DAYS THEN 1 TABLET DAILY THEREAFTER 30 tablet 0       ALLERGIES  Patient has no known allergies.    SOCIAL HISTORY  Social History     Tobacco Use     Smoking status: Never Smoker     Smokeless tobacco: Never Used   Substance Use Topics     Alcohol use: No     Drug use: No       FAMILY HISTORY  Family History   Problem Relation Age of Onset     Hypertension No family hx of      Breast Cancer No family hx of      Colon Cancer No family hx of      Prostate Cancer No family hx of      Diabetes No family hx of        Family History of Anesthesia Reaction: No  Family History of Bleeding Disorder:  No    REVIEW OF SYSTEMS  Constitutional, HEENT, cardiovascular, pulmonary, gi and gu systems are negative, except as otherwise noted.    PHYSICAL EXAM  /84   Pulse 76   Ht 1.905 m (6' 3\")   Wt 108.9 kg (240 lb)   BMI 30.00 kg/m    General Appearance: Normal  Skin:  Normal  Head:  Normal  Eyes: Normal  Ears: Normal  Nose: Normal  Mouth/Teeth: Normal  Throat: Normal  Neck: Normal    Chest/Lungs: Normal  Heart/Vascular: Normal  Abdomen: Normal    Skeletal: Normal  Lymphoid: " Normal  Blood Vessels: Normal  Neuromuscular: Normal  Other: Normal    Assessment  22 yo male with pre op physical for right index finger fracture phalanx    PLAN  This patient has been examined by me this day and has been found to be an acceptable candidate for surgery with apppropriate anesthesia.  Hunter Hull MD   5/7/2021

## 2021-05-07 NOTE — LETTER
5/7/2021      RE: Jose Jenkins  [address] [facility] [address]       Dear Colleague,    Thank you for the opportunity to participate in the care of your patient, Jose Jenkins, at the [facility] at [facility]. Please see a copy of my visit note below.    NAME: Jose Jenkins      AGE: 21 year old      SEX: male  Chief Complaint/Reason for Procedure:  Pre op for finger surgery  Indications: right index phalanx fracture  Surgeon:  Sneha/Augie Gonzalez Date of Surgery: 05/12/21  Location: [facility] [place]    HPI: 22 yo male with right index finger displaced phalanx fracture  Requiring surgery    PAST HISTORY  Past Surgical History:   Procedure Laterality Date     EXTRACTION(S) DENTAL  2019     NO HISTORY OF SURGERY         Past Medical History:   Diagnosis Date     NO ACTIVE PROBLEMS        MEDICATIONS:  Current Outpatient Medications   Medication Sig Dispense Refill     meloxicam (MOBIC) 15 MG tablet TAKE 1 TABLET BY MOUTH EVERY DAY 21 tablet 0     sertraline (ZOLOFT) 50 MG tablet Take 1.5 tablets (75 mg) by mouth daily 45 tablet 1     sertraline (ZOLOFT) 50 MG tablet TAKE 1/2 TABLET BY MOUTH EVERY MORNING FOR 5 DAYS THEN 1 TABLET DAILY THEREAFTER 30 tablet 0       ALLERGIES  Patient has no known allergies.    SOCIAL HISTORY  Social History     Tobacco Use     Smoking status: Never Smoker     Smokeless tobacco: Never Used   Substance Use Topics     Alcohol use: No     Drug use: No       FAMILY HISTORY  Family History   Problem Relation Age of Onset     Hypertension No family hx of      Breast Cancer No family hx of      Colon Cancer No family hx of      Prostate Cancer No family hx of      Diabetes No family hx of        Family History of Anesthesia Reaction: No  Family History of Bleeding Disorder:  No    REVIEW OF SYSTEMS  Constitutional, HEENT, cardiovascular, pulmonary, gi and gu systems are negative, except as otherwise noted.    PHYSICAL  "EXAM  /84   Pulse 76   Ht 1.905 m (6' 3\")   Wt 108.9 kg (240 lb)   BMI 30.00 kg/m    General Appearance: Normal  Skin:  Normal  Head:  Normal  Eyes: Normal  Ears: Normal  Nose: Normal  Mouth/Teeth: Normal  Throat: Normal  Neck: Normal    Chest/Lungs: Normal  Heart/Vascular: Normal  Abdomen: Normal    Skeletal: Normal  Lymphoid: Normal  Blood Vessels: Normal  Neuromuscular: Normal  Other: Normal    Assessment  20 yo male with pre op physical for right index finger fracture phalanx    PLAN  This patient has been examined by me this day and has been found to be an acceptable candidate for surgery with apppropriate anesthesia.  Hunter Hull MD   5/7/2021              We also had a follow up discussion of changing his sertraline to prozac for control and treatment for his anxiety and depression, that is not improved overall yet.  No HI or SI  States that he is continuing to followup with Dr Shell  And will plan to followup with me in 1 month at my [place] office outside of athletic medicine here  Discussed and started prozac 20mg daily  Dr Hull    "

## 2021-05-07 NOTE — PROGRESS NOTES
We also had a follow up discussion of changing his sertraline to prozac for control and treatment for his anxiety and depression, that is not improved overall yet.  No HI or SI  States that he is continuing to followup with Dr Shell  And will plan to followup with me in 1 month at my [place] office outside of athletic medicine here  Discussed and started prozac 20mg daily  Dr Hull

## 2021-05-11 ENCOUNTER — ANESTHESIA EVENT (OUTPATIENT)
Dept: SURGERY | Facility: AMBULATORY SURGERY CENTER | Age: 21
End: 2021-05-11

## 2021-05-12 ENCOUNTER — HOSPITAL ENCOUNTER (OUTPATIENT)
Facility: AMBULATORY SURGERY CENTER | Age: 21
Discharge: HOME | End: 2021-05-12
Attending: ORTHOPAEDIC SURGERY | Admitting: ORTHOPAEDIC SURGERY
Payer: COMMERCIAL

## 2021-05-12 ENCOUNTER — ANESTHESIA (OUTPATIENT)
Dept: SURGERY | Facility: AMBULATORY SURGERY CENTER | Age: 21
End: 2021-05-12

## 2021-05-12 ENCOUNTER — ANCILLARY PROCEDURE (OUTPATIENT)
Dept: RADIOLOGY | Facility: AMBULATORY SURGERY CENTER | Age: 21
End: 2021-05-12
Attending: ORTHOPAEDIC SURGERY
Payer: COMMERCIAL

## 2021-05-12 VITALS
OXYGEN SATURATION: 99 % | RESPIRATION RATE: 16 BRPM | HEART RATE: 55 BPM | BODY MASS INDEX: 29.22 KG/M2 | HEIGHT: 75 IN | TEMPERATURE: 97.9 F | SYSTOLIC BLOOD PRESSURE: 124 MMHG | DIASTOLIC BLOOD PRESSURE: 63 MMHG | WEIGHT: 235 LBS

## 2021-05-12 DIAGNOSIS — M79.644 FINGER PAIN, RIGHT: ICD-10-CM

## 2021-05-12 DIAGNOSIS — S62.618A: ICD-10-CM

## 2021-05-12 DIAGNOSIS — S62.91XA CLOSED FRACTURE OF RIGHT HAND, INITIAL ENCOUNTER: Primary | ICD-10-CM

## 2021-05-12 DIAGNOSIS — S62.610A CLOSED DISPLACED FRACTURE OF PROXIMAL PHALANX OF RIGHT INDEX FINGER, INITIAL ENCOUNTER: ICD-10-CM

## 2021-05-12 DIAGNOSIS — R52 PAIN: ICD-10-CM

## 2021-05-12 PROCEDURE — 26735 TREAT FINGER FRACTURE EACH: CPT | Mod: F6

## 2021-05-12 DEVICE — IMPLANTABLE DEVICE: Type: IMPLANTABLE DEVICE | Site: FINGER | Status: FUNCTIONAL

## 2021-05-12 RX ORDER — OXYCODONE HYDROCHLORIDE 5 MG/1
5 TABLET ORAL EVERY 4 HOURS PRN
Status: DISCONTINUED | OUTPATIENT
Start: 2021-05-12 | End: 2021-05-13 | Stop reason: HOSPADM

## 2021-05-12 RX ORDER — BUPIVACAINE HYDROCHLORIDE 2.5 MG/ML
INJECTION, SOLUTION INFILTRATION; PERINEURAL PRN
Status: DISCONTINUED | OUTPATIENT
Start: 2021-05-12 | End: 2021-05-12 | Stop reason: HOSPADM

## 2021-05-12 RX ORDER — NALOXONE HYDROCHLORIDE 0.4 MG/ML
0.2 INJECTION, SOLUTION INTRAMUSCULAR; INTRAVENOUS; SUBCUTANEOUS
Status: DISCONTINUED | OUTPATIENT
Start: 2021-05-12 | End: 2021-05-12 | Stop reason: HOSPADM

## 2021-05-12 RX ORDER — SODIUM CHLORIDE, SODIUM LACTATE, POTASSIUM CHLORIDE, CALCIUM CHLORIDE 600; 310; 30; 20 MG/100ML; MG/100ML; MG/100ML; MG/100ML
INJECTION, SOLUTION INTRAVENOUS CONTINUOUS
Status: DISCONTINUED | OUTPATIENT
Start: 2021-05-12 | End: 2021-05-13 | Stop reason: HOSPADM

## 2021-05-12 RX ORDER — FENTANYL CITRATE 50 UG/ML
25-50 INJECTION, SOLUTION INTRAMUSCULAR; INTRAVENOUS
Status: DISCONTINUED | OUTPATIENT
Start: 2021-05-12 | End: 2021-05-13 | Stop reason: HOSPADM

## 2021-05-12 RX ORDER — FENTANYL CITRATE 50 UG/ML
25-50 INJECTION, SOLUTION INTRAMUSCULAR; INTRAVENOUS
Status: DISCONTINUED | OUTPATIENT
Start: 2021-05-12 | End: 2021-05-12 | Stop reason: HOSPADM

## 2021-05-12 RX ORDER — OXYCODONE HYDROCHLORIDE 5 MG/1
5-10 TABLET ORAL EVERY 4 HOURS PRN
Qty: 6 TABLET | Refills: 0 | Status: SHIPPED | OUTPATIENT
Start: 2021-05-12

## 2021-05-12 RX ORDER — ONDANSETRON 2 MG/ML
INJECTION INTRAMUSCULAR; INTRAVENOUS PRN
Status: DISCONTINUED | OUTPATIENT
Start: 2021-05-12 | End: 2021-05-12

## 2021-05-12 RX ORDER — CEFAZOLIN SODIUM 2 G/50ML
2 SOLUTION INTRAVENOUS SEE ADMIN INSTRUCTIONS
Status: DISCONTINUED | OUTPATIENT
Start: 2021-05-12 | End: 2021-05-12 | Stop reason: HOSPADM

## 2021-05-12 RX ORDER — BUPIVACAINE HYDROCHLORIDE 5 MG/ML
INJECTION, SOLUTION EPIDURAL; INTRACAUDAL
Status: COMPLETED | OUTPATIENT
Start: 2021-05-12 | End: 2021-05-12

## 2021-05-12 RX ORDER — ONDANSETRON 2 MG/ML
4 INJECTION INTRAMUSCULAR; INTRAVENOUS EVERY 30 MIN PRN
Status: DISCONTINUED | OUTPATIENT
Start: 2021-05-12 | End: 2021-05-13 | Stop reason: HOSPADM

## 2021-05-12 RX ORDER — NALOXONE HYDROCHLORIDE 0.4 MG/ML
0.4 INJECTION, SOLUTION INTRAMUSCULAR; INTRAVENOUS; SUBCUTANEOUS
Status: DISCONTINUED | OUTPATIENT
Start: 2021-05-12 | End: 2021-05-13 | Stop reason: HOSPADM

## 2021-05-12 RX ORDER — CEFAZOLIN SODIUM 2 G/50ML
2 SOLUTION INTRAVENOUS
Status: DISCONTINUED | OUTPATIENT
Start: 2021-05-12 | End: 2021-05-12 | Stop reason: HOSPADM

## 2021-05-12 RX ORDER — NALOXONE HYDROCHLORIDE 0.4 MG/ML
0.2 INJECTION, SOLUTION INTRAMUSCULAR; INTRAVENOUS; SUBCUTANEOUS
Status: DISCONTINUED | OUTPATIENT
Start: 2021-05-12 | End: 2021-05-13 | Stop reason: HOSPADM

## 2021-05-12 RX ORDER — FLUMAZENIL 0.1 MG/ML
0.2 INJECTION, SOLUTION INTRAVENOUS
Status: DISCONTINUED | OUTPATIENT
Start: 2021-05-12 | End: 2021-05-12 | Stop reason: HOSPADM

## 2021-05-12 RX ORDER — PROPOFOL 10 MG/ML
INJECTION, EMULSION INTRAVENOUS PRN
Status: DISCONTINUED | OUTPATIENT
Start: 2021-05-12 | End: 2021-05-12

## 2021-05-12 RX ORDER — LIDOCAINE 40 MG/G
CREAM TOPICAL
Status: DISCONTINUED | OUTPATIENT
Start: 2021-05-12 | End: 2021-05-12 | Stop reason: HOSPADM

## 2021-05-12 RX ORDER — MEPERIDINE HYDROCHLORIDE 25 MG/ML
12.5 INJECTION INTRAMUSCULAR; INTRAVENOUS; SUBCUTANEOUS
Status: DISCONTINUED | OUTPATIENT
Start: 2021-05-12 | End: 2021-05-13 | Stop reason: HOSPADM

## 2021-05-12 RX ORDER — KETAMINE HYDROCHLORIDE 10 MG/ML
INJECTION, SOLUTION INTRAMUSCULAR; INTRAVENOUS PRN
Status: DISCONTINUED | OUTPATIENT
Start: 2021-05-12 | End: 2021-05-12

## 2021-05-12 RX ORDER — KETOROLAC TROMETHAMINE 30 MG/ML
INJECTION, SOLUTION INTRAMUSCULAR; INTRAVENOUS PRN
Status: DISCONTINUED | OUTPATIENT
Start: 2021-05-12 | End: 2021-05-12

## 2021-05-12 RX ORDER — GLYCOPYRROLATE 0.2 MG/ML
INJECTION, SOLUTION INTRAMUSCULAR; INTRAVENOUS PRN
Status: DISCONTINUED | OUTPATIENT
Start: 2021-05-12 | End: 2021-05-12

## 2021-05-12 RX ORDER — NALOXONE HYDROCHLORIDE 0.4 MG/ML
0.4 INJECTION, SOLUTION INTRAMUSCULAR; INTRAVENOUS; SUBCUTANEOUS
Status: DISCONTINUED | OUTPATIENT
Start: 2021-05-12 | End: 2021-05-12 | Stop reason: HOSPADM

## 2021-05-12 RX ORDER — ACETAMINOPHEN 325 MG/1
975 TABLET ORAL ONCE
Status: COMPLETED | OUTPATIENT
Start: 2021-05-12 | End: 2021-05-12

## 2021-05-12 RX ORDER — PROPOFOL 10 MG/ML
INJECTION, EMULSION INTRAVENOUS CONTINUOUS PRN
Status: DISCONTINUED | OUTPATIENT
Start: 2021-05-12 | End: 2021-05-12

## 2021-05-12 RX ORDER — BACITRACIN ZINC 500 [USP'U]/G
OINTMENT TOPICAL PRN
Status: DISCONTINUED | OUTPATIENT
Start: 2021-05-12 | End: 2021-05-12 | Stop reason: HOSPADM

## 2021-05-12 RX ORDER — SODIUM CHLORIDE, SODIUM LACTATE, POTASSIUM CHLORIDE, CALCIUM CHLORIDE 600; 310; 30; 20 MG/100ML; MG/100ML; MG/100ML; MG/100ML
INJECTION, SOLUTION INTRAVENOUS CONTINUOUS
Status: DISCONTINUED | OUTPATIENT
Start: 2021-05-12 | End: 2021-05-12 | Stop reason: HOSPADM

## 2021-05-12 RX ORDER — GABAPENTIN 300 MG/1
300 CAPSULE ORAL ONCE
Status: COMPLETED | OUTPATIENT
Start: 2021-05-12 | End: 2021-05-12

## 2021-05-12 RX ORDER — ACETAMINOPHEN 500 MG
500 TABLET ORAL ONCE
COMMUNITY

## 2021-05-12 RX ORDER — ONDANSETRON 4 MG/1
4 TABLET, ORALLY DISINTEGRATING ORAL EVERY 30 MIN PRN
Status: DISCONTINUED | OUTPATIENT
Start: 2021-05-12 | End: 2021-05-13 | Stop reason: HOSPADM

## 2021-05-12 RX ADMIN — SODIUM CHLORIDE, SODIUM LACTATE, POTASSIUM CHLORIDE, CALCIUM CHLORIDE: 600; 310; 30; 20 INJECTION, SOLUTION INTRAVENOUS at 13:50

## 2021-05-12 RX ADMIN — ONDANSETRON 4 MG: 2 INJECTION INTRAMUSCULAR; INTRAVENOUS at 14:17

## 2021-05-12 RX ADMIN — GABAPENTIN 300 MG: 300 CAPSULE ORAL at 13:16

## 2021-05-12 RX ADMIN — BUPIVACAINE HYDROCHLORIDE 15 ML: 5 INJECTION, SOLUTION EPIDURAL; INTRACAUDAL at 13:37

## 2021-05-12 RX ADMIN — KETAMINE HYDROCHLORIDE 30 MG: 10 INJECTION, SOLUTION INTRAMUSCULAR; INTRAVENOUS at 14:09

## 2021-05-12 RX ADMIN — PROPOFOL 40 MG: 10 INJECTION, EMULSION INTRAVENOUS at 14:00

## 2021-05-12 RX ADMIN — PROPOFOL 150 MCG/KG/MIN: 10 INJECTION, EMULSION INTRAVENOUS at 13:56

## 2021-05-12 RX ADMIN — ACETAMINOPHEN 975 MG: 325 TABLET ORAL at 13:16

## 2021-05-12 RX ADMIN — PROPOFOL 100 MCG/KG/MIN: 10 INJECTION, EMULSION INTRAVENOUS at 15:01

## 2021-05-12 RX ADMIN — KETOROLAC TROMETHAMINE 30 MG: 30 INJECTION, SOLUTION INTRAMUSCULAR; INTRAVENOUS at 15:10

## 2021-05-12 RX ADMIN — GLYCOPYRROLATE 0.2 MG: 0.2 INJECTION, SOLUTION INTRAMUSCULAR; INTRAVENOUS at 14:09

## 2021-05-12 RX ADMIN — CEFAZOLIN SODIUM 2 G: 2 SOLUTION INTRAVENOUS at 13:50

## 2021-05-12 RX ADMIN — PROPOFOL: 10 INJECTION, EMULSION INTRAVENOUS at 14:25

## 2021-05-12 ASSESSMENT — MIFFLIN-ST. JEOR: SCORE: 2161.58

## 2021-05-12 NOTE — BRIEF OP NOTE
[facility] [place]    Brief Operative Note    Pre-operative diagnosis: Closed displaced fracture of proximal phalanx of index finger [S62.568S]  Post-operative diagnosis Same as pre-operative diagnosis    Procedure: Procedure(s):  Right Index Finger Open Reduction Internal Fixation  Surgeon: Surgeon(s) and Role:     * Gary Hoover MD - Primary     * Jose L Garland MD - Resident - Assisting  Anesthesia: Regional   Estimated blood loss: 5 mL  Drains: None  Specimens: * No specimens in log *  Findings:   See op report.  Complications: None.  Implants:   Implant Name Type Inv. Item Serial No.  Lot No. LRB No. Used Action   1.5 mm Titanium Variable Angle Locking Plate (Condylar Plate, 2 Holes Head, 6 Hole Shaft)      Right 1 Implanted   Variable Angle Locking Hand System. 1.5mm Titanium Cortex Screws Metallic Hardware/[place]   Lore  Right 1 Implanted and Explanted   Variable Angle Locking Hand System. 1.5mm Titanium Cortex Screws Metallic Hardware/[place]   Lore  Right 1 Implanted          1 Implanted

## 2021-05-12 NOTE — DISCHARGE INSTRUCTIONS
"[facility]  Home Care Following Anesthesia  For 24 hours after surgery:  1. Get plenty of rest.  A responsible adult must stay with you for at least 24 hours after you leave the surgery center.  2. Do not drive or use heavy equipment.  If you have weakness or tingling, don't drive or use heavy equipment until this feeling goes away.   3. Do not drink alcohol.   4. Avoid strenuous or risky activities.  Ask for help when climbing stairs.  5. You may feel lightheaded.  IF so, sit for a few minutes before standing.  Have someone help you get up.   6. If you have nausea (feel sick to your stomach): Drink only clear liquids such as apple juice, ginger ale, broth or 7-Up.  Rest may also help.  Be sure to drink enough fluids.  Move to a regular diet as you feel able.   7. You may have a slight fever.  Call the doctor if your fever is over 100 F (37.7 C) (taken under the tongue) or lasts longer than 24 hours.  8. You may have a dry mouth, a sore throat, muscle aches or trouble sleeping. These should go away after 24 hours.  9. Do not make important or legal decisions.   10. It is recommended to avoid smoking.        Today you received an Exparel block to numb the nerves near your surgery site.  This is a block using local anesthetic or \"numbing\" medication injected around the nerves to anesthetize or \"numb\" the area supplied by those nerves.  This block is injected into the muscle layer near your surgical site.  This medication may numb the location where you had surgery up to 72 hours.  If your surgical site is an arm or leg you should be careful with your affected limb, since it is possible to injure your limb without being aware of it due to the numbing.  Until full feeling returns, you should guard against bumping or hitting your limb, and avoid extreme hot or cold temperatures on the skin.  As the block wears off, the feeling will return as a tingling or prickly sensation near your " surgical site.  You will experince more discomfort from your incision as the feeling returns.  You may want to take a pain pill (a narcotic or Tylenol if this was prescribed by your surgeon) when you start to experience mild pain before the pain beomes more severe.  If your pain medications do not control your pain, you should notify your surgeon.    Tips for taking pain medications  To get the best pain relief possible, remember these points:    Take pain medications as directed, before pain becomes severe.    Pain medication can upset your stomach: taking it with food may help.    Constipation is a common side effect of pain medication. Drink plenty of  fluids.    Eat foods high in fiber. Take a stool softener if recommended by your doctor or pharmacist.    Do not drink alcohol, drive or operate machinery while taking pain medications.    Ask about other ways to control pain, such as with heat, ice or relaxation.    Tylenol/Acetaminophen Consumption  To help encourage the safe use of acetaminophen, the makers of TYLENOL  have lowered the maximum daily dose for single-ingredient Extra Strength TYLENOL  (acetaminophen) products sold in the [place] from 8 pills per day (4,000 mg) to 6 pills per day (3,000 mg). The dosing interval has also changed from 2 pills every 4-6 hours to 2 pills every 6 hours.    If you feel your pain relief is insufficient, you may take Tylenol/Acetaminophen in addition to your narcotic pain medication.     Be careful not to exceed 3,000 mg of Tylenol/Acetaminophen in a 24 hour period from all sources.    If you are taking extra strength Tylenol/acetaminophen (500 mg), the maximum dose is 6 tablets in 24 hours.    If you are taking regular strength acetaminophen (325 mg), the maximum dose is 9 tablets in 24 hours.    Tylenol 975mg given at 1:16pm. Next dose available after 7:15pm. Then follow package instructions.     Call a doctor for any of the followin. Signs of infection (fever, growing  tenderness at the surgery site, a large amount of drainage or bleeding, severe pain, foul-smelling drainage, redness, swelling).  2. It has been over 8 to 10 hours since surgery and you are still not able to urinate (pass water).  3. Headache for over 24 hours.  4. Numbness, tingling or weakness the day after surgery (if you had spinal anesthesia).  5. Signs of Covid-19 infection (temperature over 100 degrees, shortness of breath, cough, loss of taste/smell, generalized body aches, persistent headache, chills, sore throat, nausea/vomiting/diarrhea)  Your doctor is:  Dr. Gary Hoover, Orthopaedics: [phone]                  Or dial [phone] and ask for the resident on call for:  Orthopaedics  For emergency care, call the:  [facility] Emergency Department: [phone] (TTY for hearing impaired: [phone])

## 2021-05-12 NOTE — ANESTHESIA PREPROCEDURE EVALUATION
Anesthesia Pre-Procedure Evaluation    Patient: Jose Jenkins   MRN: [id number] : 2000        Preoperative Diagnosis: Closed displaced fracture of proximal phalanx of index finger [S62.618A]   Procedure : Procedure(s):  Right Index Finger Open Reduction     Past Medical History:   Diagnosis Date     NO ACTIVE PROBLEMS       Past Surgical History:   Procedure Laterality Date     EXTRACTION(S) DENTAL  2019     NO HISTORY OF SURGERY        No Known Allergies   Social History     Tobacco Use     Smoking status: Never Smoker     Smokeless tobacco: Never Used   Substance Use Topics     Alcohol use: No      Wt Readings from Last 1 Encounters:   21 106.6 kg (235 lb)        Anesthesia Evaluation   Pt has had prior anesthetic. Type: General.        ROS/MED HX  ENT/Pulmonary:  - neg pulmonary ROS     Neurologic:  - neg neurologic ROS     Cardiovascular:  - neg cardiovascular ROS     METS/Exercise Tolerance:     Hematologic:  - neg hematologic  ROS     Musculoskeletal:  - neg musculoskeletal ROS     GI/Hepatic:  - neg GI/hepatic ROS     Renal/Genitourinary:  - neg Renal ROS     Endo:  - neg endo ROS     Psychiatric/Substance Use:  - neg psychiatric ROS     Infectious Disease:  - neg infectious disease ROS     Malignancy:  - neg malignancy ROS     Other:  - neg other ROS          Physical Exam    Airway  airway exam normal           Respiratory Devices and Support         Dental  no notable dental history         Cardiovascular   cardiovascular exam normal          Pulmonary   pulmonary exam normal                OUTSIDE LABS:  CBC:   Lab Results   Component Value Date    HGB 15.8 (H) 2018     BMP:   Lab Results   Component Value Date     08/10/2020    POTASSIUM 4.1 08/10/2020    CHLORIDE 105 08/10/2020    CO2 31 08/10/2020    BUN 18 08/10/2020    CR 1.22 (H) 08/10/2020    GLC 84 08/10/2020     COAGS: No results found for: PTT, INR, FIBR  POC: No results found for: BGM, HCG, HCGS  HEPATIC:   Lab  Results   Component Value Date    ALBUMIN 4.2 08/10/2020    PROTTOTAL 7.6 08/10/2020    ALT 43 08/10/2020    AST 22 08/10/2020    ALKPHOS 64 (L) 08/10/2020    BILITOTAL 0.9 08/10/2020     OTHER:   Lab Results   Component Value Date    MAICO 9.1 08/10/2020    TSH Canceled, Test credited 08/04/2020       Anesthesia Plan    ASA Status:  1   NPO Status:  NPO Appropriate    Anesthesia Type: MAC.     - Reason for MAC: straight local not clinically adequate   Induction: Intravenous.   Maintenance: TIVA.        Consents    Anesthesia Plan(s) and associated risks, benefits, and realistic alternatives discussed. Questions answered and patient/representative(s) expressed understanding.     - Discussed with:  Patient         Postoperative Care    Pain management: Oral pain medications, Peripheral nerve block (Single Shot).   PONV prophylaxis: Ondansetron (or other 5HT-3), Dexamethasone or Solumedrol     Comments:                Abhi Dias MD, MD

## 2021-05-12 NOTE — ANESTHESIA POSTPROCEDURE EVALUATION
Patient: Jose Jenkins    Procedure(s):  Right Index Finger Open Reduction Internal Fixation    Diagnosis:Closed displaced fracture of proximal phalanx of index finger [S62.618A]  Diagnosis Additional Information: No value filed.    Anesthesia Type:  MAC    Note:  Disposition: Outpatient   Postop Pain Control: Uneventful            Sign Out: Well controlled pain   PONV: No   Neuro/Psych: Uneventful            Sign Out: Acceptable/Baseline neuro status   Airway/Respiratory: Uneventful            Sign Out: Acceptable/Baseline resp. status   CV/Hemodynamics: Uneventful            Sign Out: Acceptable CV status; No obvious hypovolemia; No obvious fluid overload   Other NRE: NONE   DID A NON-ROUTINE EVENT OCCUR? No           Last vitals:  Vitals:    05/12/21 1527 05/12/21 1530 05/12/21 1545   BP: (!) 96/39 (!) 106/39 114/42   Pulse:      Resp: 16 16 16   Temp: 36.5  C (97.7  F)  36.6  C (97.8  F)   SpO2: 95% 96% 96%       Last vitals prior to Anesthesia Care Transfer:  CRNA VITALS  5/12/2021 1454 - 5/12/2021 1554      5/12/2021             Resp Rate (set):  10          Electronically Signed By: Abhi Dias MD, MD  May 12, 2021  3:56 PM

## 2021-05-12 NOTE — ANESTHESIA CARE TRANSFER NOTE
Patient: Jose Jenkins    Procedure(s):  Right Index Finger Open Reduction Internal Fixation    Diagnosis: Closed displaced fracture of proximal phalanx of index finger [S62.884H]  Diagnosis Additional Information: No value filed.    Anesthesia Type:   MAC     Note:    Oropharynx: spontaneously breathing  Level of Consciousness: drowsy  Oxygen Supplementation: room air    Independent Airway: airway patency satisfactory and stable  Dentition: dentition unchanged  Vital Signs Stable: post-procedure vital signs reviewed and stable  Report to RN Given: handoff report given  Patient transferred to: Phase II  Comments: 96/39 95%  97.7-66-18    Handoff Report: Identifed the Patient, Identified the Reponsible Provider, Reviewed the pertinent medical history, Discussed the surgical course, Reviewed Intra-OP anesthesia mangement and issues during anesthesia, Set expectations for post-procedure period and Allowed opportunity for questions and acknowledgement of understanding      Vitals: (Last set prior to Anesthesia Care Transfer)  CRNA VITALS  5/12/2021 1454 - 5/12/2021 1530      5/12/2021             Resp Rate (set):  10        Electronically Signed By: CLIFFORD Paredes CRNA  May 12, 2021  3:30 PM

## 2021-05-12 NOTE — ANESTHESIA PROCEDURE NOTES
Supraclavicular Procedure Note  Pre-Procedure   Staff -        Anesthesiologist:  Abhi Dias MD       Performed By: anesthesiologist       Location: pre-op       Procedure Start/Stop Times: 5/12/2021 1:30 PM and 5/12/2021 1:38 PM       Pre-Anesthestic Checklist: patient identified, IV checked, site marked, risks and benefits discussed, informed consent, monitors and equipment checked, pre-op evaluation, at physician/surgeon's request and post-op pain management  Timeout:       Correct Patient: Yes        Correct Procedure: Yes        Correct Site: Yes        Correct Position: Yes        Correct Laterality: Yes        Site Marked: Yes  Procedure Documentation  Procedure: Supraclavicular       Laterality: right       Patient Position: sitting       Skin prep: Chloraprep       Needle Type: insulated and short bevel       Needle Gauge: 21.        Needle Length (Inches): 4        - Ultrasound guided       - Ultrasound used to identify targeted nerve, plexus, vascular marker, or fascial plane and place a needle adjacent to it in real-time       - Ultrasound was used to visualize the spread of anesthetic in close proximity to the above referenced structure       - A permanent image is entered into the patient's record.       - The nerve(s) appeared anatomically normal       - There were no apparent abnormal pathologic findings    Assessment/Narrative         The placement was negative for: blood aspirated, painful injection and site bleeding       Paresthesias: No.     Bolus given via needle..        Secured via.        Insertion/Infusion Method: Single Shot    Medication(s) Administered   Bupivacaine 0.5% PF (Infiltration), 15 mL  Mepivacaine 2% PF (Perineural), 10 mL  Medication Administration Time: 5/12/2021 1:37 PM

## 2021-05-12 NOTE — OR NURSING
Patient received right side Supraclavicular nerve block  without Exparel.   and Versed 2mg given. Tolerated procedure well.

## 2021-05-12 NOTE — OP NOTE
Procedure Date: 05/12/2021    DATE OF PROCEDURE:  05/12/2021.    PREOPERATIVE DIAGNOSIS:  Right index finger proximal phalanx fracture, displaced.    POSTOPERATIVE DIAGNOSIS:  Right index finger proximal phalanx fracture, displaced.    PROCEDURE:  Open reduction and internal fixation of right index finger proximal phalanx fracture.    SURGEON:  Gary Hoover MD    ASSISTANT:  King Garland M.D., PGY-4.    ANESTHESIA:  Regional block.    HISTORY OF PRESENT ILLNESS:  Jose is a 21-year-old male who fractured his right index finger proximal phalanx playing football.  Initially, this was nondisplaced and was managed nonoperatively, but the fracture has gone on to displace.  He presents today for open reduction and internal fixation of the fracture.  We discussed the risks and benefits of surgery and those include but are not limited to infection, bleeding, injury to blood vessels, tendons, nerves, nonunion, malunion, stiffness and possibly need for more surgery such as hardware removal.  All questions were answered, and operative consents were signed.    DESCRIPTION OF PROCEDURE:  The patient was given a regional block by the anesthesia service.  He was taken to the OR and placed supine on the OR table.  Right arm was placed on a hand table.  Right upper extremity was prepped and draped in a standard sterile fashion.  Limb was exsanguinated, tourniquet was elevated to 250 mmHg.  A mid axillary, radial incision was made over the proximal phalanx extending from the PIP joint to the MP joint.  The soft tissues were dissected down to the neurovascular bundle.  This was carefully protected.  The extensor tendon was retracted dorsally leaving the intrinsic contribution and intact.  The wound was irrigated copiously.  The fracture was identified.  There was some early fracture callus that needed to be freed and released to mobilize the fracture.  Once the fracture was mobilized, it was reduced with traction and rotation.   The fracture was then provisionally reduced with a fracture clamp.  The position was checked under fluoroscopy.  The reduction was anatomic in the AP and lateral planes and there was no malrotation noted with passive flexion tenodesis of the  digit.  The fracture was then definitively stabilized with a Synthes 1.5 mm T-plate.  All screws were placed in standard AO fashion.  The reduction was anatomic in the AP and lateral views.  Hardware was in the appropriate position.  There was no malrotation noted with passive flexion tenodesis of the digit.  The wound was irrigated copiously.  The skin was closed with 5-0 nylon.  He was placed in a well-padded radial gutter splint.    BLOOD LOSS:  Less than 5 mL and there were no complications and he was taken to recovery room in stable condition.    Postoperatively, we can put him into an OT therapy splint next week and begin some gentle motion.    Gary Hoover MD        D: 2021   T: 2021   MT: JECMT    Name:     PAWAN GUTIERREZAurora  MRN:      [id number]        Account:        [id number]   :      2000           Procedure Date: 2021     Document: [id number]

## 2021-05-13 ENCOUNTER — DOCUMENTATION ONLY (OUTPATIENT)
Dept: FAMILY MEDICINE | Facility: CLINIC | Age: 21
End: 2021-05-13

## 2021-05-13 DIAGNOSIS — S62.618A: Primary | ICD-10-CM

## 2021-05-13 NOTE — PROGRESS NOTES
"[facility] ATHLETIC MEDICINE  [facility]   Sport Psychology Progress Note      Location of Visit: [facility] - Due to COVID-19 pandemic, this appointment was conducted via telehealth services. [address] [facility].  Date of Visit: 21  Duration of Session: 45 minutes  Referred by: teammate/friend    Self-reported Concerns/Symptoms:  Anxiety/worry; Depression; Sleep disruption; Weight loss; feeling \"not myself\".    Suicide and Risk Assessment:  Recent suicidal thoughts: Yes: Ct reports that he has thoughts of \"this is getting exhausting\" and \"what would happen if I ?\" However, notes that these thoughts are much less intense or frequent than they used to be. He reports not being \"too bothered\" by them when they come.   Past suicidal thoughts: Yes: Ct reports \"some\" helpless/hopeless thoughts in 9th/10th grade, but that his recent thoughts of helplessness feel \"cranked-up.\"  Recent homicidal thoughts: No  Any attempts in the past: No  Any family/friends/loved ones die by suicide: No  Plan or considering various methods: No 0 Ct denies intent, plan or means and states he would not act on his thoughts. Ct states reasons for living being \"for my family and the people I love. I'm a [place]anda[place] and there are things I want to do.\"  Access to guns: No  Protective factors: no h/o suicide attempt, no plan or intent, no h/o risky impulsive behavior, no access to lethal means, h/o seeking help when needed, future oriented, none to minimal alcohol use , commitment to family, Baptism beliefs and stable housing  Verbal contract for safety: Yes    Jose denies current urges to self-harm, homicidal ideation, suicidal ideation, means, plans, or intent.    Mental Status & Observations:  Jose appeared generally alert and oriented. Dress was appropriate to the weather and occasion. Grooming and hygiene were appropriate. Eye contact was good. Speech was of normal volume and " "normal. Thought processes were relevant, logical and goal-directed. Thought content was within normal limits with no evidence of psychotic or paranoid features. Memory appeared intact. He exhibited no fidgety motor activity during the appointment, as he often does; however commented that he has noticed an increased in it in recent months. Mood was appropriate with congruent affect. Insight and judgment appeared age appropriate with good focus in session.  Behavior was candid, cooperative and open    Ct reports that he stopped taking 75mg sertraline in mid December 2020 (Dr. Hull-was taking since 8/21/20) due to \"not really feeling it was helping.\" However, he reports noticing in past 6-8 weeks a significant increase in panic attacks, rumination, anxious catastrophic thoughts, low appetite, shakiness/restlessness and feelings of tension. He reports that he broke his finger recently and Dr. Hull prescribed Prozac (20mg). Jose agreed that he perhaps hadn't noticed that medication had actually been helpful to him and he notes feeling eager to see if Prozac is helpful for his anxiety symptoms. He has a follow-up with Dr. Hull in a few weeks before he transfer to [employer] for football. He reports feeling excited about transferring for both friends and football reasons.     Intervention:  Assessed safety and current depression/anxiety symptoms. Reflected on progress back in December 2020, discontinuing medication on his own then, and his return of intense symptoms this spring. Reinforced Jose for following up with Dr. Hull and encouraged medication adherence and talking through a transition of care plan with Dr. Hull at his next appointment. Provided [facility] campus counseling resource and contact information so he can continue counseling when he arrives on campus in June. Emphasized the importance of participating in counseling and medication management.       Clinical " Impressions:  296.22 (F32.1) Major Depressive Disorder, single episode, moderate-severe with anxious distress  300.02 (F41.1) Generalized Anxiety Disorder    Therapy objectives/goals:  Decrease anxiety symptoms  Decrease depressive symptoms  Decrease perceived stress  Enhance self-care  Increase willingness to be vulnerable and experience emotions  Improve mood  Improve sleep  Provide support  Teach and improve coping skills    Therapy follow-up plan:  Participate in counseling at [facility] counseling center  Follow-up with sports medicine physician for medication management    Jerod Martinez, PhD LP, CMPC

## 2021-05-18 DIAGNOSIS — S62.618A: Primary | ICD-10-CM

## 2021-05-19 ENCOUNTER — THERAPY VISIT (OUTPATIENT)
Dept: OCCUPATIONAL THERAPY | Facility: CLINIC | Age: 21
End: 2021-05-19
Payer: COMMERCIAL

## 2021-05-19 ENCOUNTER — ANCILLARY PROCEDURE (OUTPATIENT)
Dept: GENERAL RADIOLOGY | Facility: CLINIC | Age: 21
End: 2021-05-19
Attending: PHYSICIAN ASSISTANT
Payer: COMMERCIAL

## 2021-05-19 ENCOUNTER — OFFICE VISIT (OUTPATIENT)
Dept: ORTHOPEDICS | Facility: CLINIC | Age: 21
End: 2021-05-19
Payer: COMMERCIAL

## 2021-05-19 DIAGNOSIS — M25.649 FINGER STIFFNESS: ICD-10-CM

## 2021-05-19 DIAGNOSIS — Z98.890 POST-OPERATIVE STATE: Primary | ICD-10-CM

## 2021-05-19 DIAGNOSIS — S62.618A: ICD-10-CM

## 2021-05-19 DIAGNOSIS — Z47.89 AFTERCARE FOLLOWING SURGERY OF THE MUSCULOSKELETAL SYSTEM: ICD-10-CM

## 2021-05-19 PROCEDURE — 99024 POSTOP FOLLOW-UP VISIT: CPT | Performed by: PHYSICIAN ASSISTANT

## 2021-05-19 PROCEDURE — 97110 THERAPEUTIC EXERCISES: CPT | Mod: GO | Performed by: OCCUPATIONAL THERAPIST

## 2021-05-19 PROCEDURE — 97165 OT EVAL LOW COMPLEX 30 MIN: CPT | Mod: GO | Performed by: OCCUPATIONAL THERAPIST

## 2021-05-19 PROCEDURE — 97760 ORTHOTIC MGMT&TRAING 1ST ENC: CPT | Mod: GO | Performed by: OCCUPATIONAL THERAPIST

## 2021-05-19 PROCEDURE — 73140 X-RAY EXAM OF FINGER(S): CPT | Mod: RT | Performed by: RADIOLOGY

## 2021-05-19 NOTE — PROGRESS NOTES
Date of Service: May 19, 2021    Chief Complaint: Post operative follow up.     Date of Surgery: 5/12/21    Procedure Performed: Open reduction and internal fixation of right index finger proximal phalanx fracture.     Interval events: Jose Jenkins is a 21 year old male who presents today for a postoperative follow up.     The past medical history was reviewed updated in the EMR. This includes medications, surgeries, social history, and review of systems.    Physical examination:  Well-developed, well-nourished and in no acute distress.  Alert and oriented to surroundings.  On examination of the ight index finger, incision is well-approximated with sutures in place. There is no erythema, drainage, or dehiscence. Swelling is Mild. Sensation is intact in median, radial and ulnar nerve distributions. Patient can actively flex and extend all digits and thumb. The patient is not able to make a full composite fist.  Fingers are warm and well-perfused. Radial pulse is palpable.     Radiographs: Three views of the right index were obtained and reviewed. These demonstrate s/p ORIF righ index proximal phalanx with stable hardware alignment.     Assessment: 21 year old male s/p right index finger ORIF, progressing appropriately.     Plan:  He will see hand therapy today for fabrication of a short forearm based radial gutter to include the index and middle finger in intrinsic plus, DIP free. To be worn at all times except for hygeine and gentle ROM. Sutures removed today and steri strips applied. Patient should continue to wash wound with soap and water daily and pat dry. No immersing the wound in water for prolonged periods such as tub baths or dishwashing without gloves until wound has healed. Will have him follow up in 4 weeks post op with Dr. Hoover.     DIPESH CONNER PA-C  May 19, 2021 10:19 AM   Orthopaedic Surgery

## 2021-05-19 NOTE — LETTER
5/19/2021         RE: Jose Jenkins  [address] [facility] [address]        Dear Colleague,    Thank you for referring your patient, Jose Jenkins, to the [facility] [place]. Please see a copy of my visit note below.    Date of Service: May 19, 2021    Chief Complaint: Post operative follow up.     Date of Surgery: 5/12/21    Procedure Performed: Open reduction and internal fixation of right index finger proximal phalanx fracture.     Interval events: Jose Jenkins is a 21 year old male who presents today for a postoperative follow up.     The past medical history was reviewed updated in the EMR. This includes medications, surgeries, social history, and review of systems.    Physical examination:  Well-developed, well-nourished and in no acute distress.  Alert and oriented to surroundings.  On examination of the ight index finger, incision is well-approximated with sutures in place. There is no erythema, drainage, or dehiscence. Swelling is Mild. Sensation is intact in median, radial and ulnar nerve distributions. Patient can actively flex and extend all digits and thumb. The patient is not able to make a full composite fist.  Fingers are warm and well-perfused. Radial pulse is palpable.     Radiographs: Three views of the right index were obtained and reviewed. These demonstrate s/p ORIF righ index proximal phalanx with stable hardware alignment.     Assessment: 21 year old male s/p right index finger ORIF, progressing appropriately.     Plan:  He will see hand therapy today for fabrication of a short forearm based radial gutter to include the index and middle finger in intrinsic plus, DIP free. To be worn at all times except for hygeine and gentle ROM. Sutures removed today and steri strips applied. Patient should continue to wash wound with soap and water daily and pat dry. No immersing the wound in water for prolonged periods such as tub baths or dishwashing  without gloves until wound has healed. Will have him follow up in 4 weeks post op with Dr. Hoover.     DIPESH CONNER PA-C  May 19, 2021 10:19 AM   Orthopaedic Surgery

## 2021-05-19 NOTE — PROGRESS NOTES
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  Will be returning to this clinic on 6/16 for follow-up with MD.    Subjective:  Jose Jenkins is a 21 year old male.    Patient reports symptoms of the right index finger which occurred due to playing football, helmet hit finger. Since onset symptoms are Gradually getting better.  General health as reported by patient is good.  Pertinent medical history includes:Depression, Mental Illness  Medical allergies:none.  Surgical history: orthopedic: current.  Medication history: Anti-depressants.    Current occupation is student, kinesiolgy  Job Tasks: Computer Work, Lifting, Carrying, Pushing, Pulling    Occupational Profile Information:  Right hand dominant  Prior functional level:  no limitations  Patient reports symptoms of pain, stiffness/loss of motion, weakness/loss of strength and edema  Special tests:  x-ray.    Previous treatment: surgery  Barriers include:none  Mobility: No difficulty  Transportation: drives    Functional Outcome Measure:   Upper Extremity Functional Index Score:  SCORE:   Column Totals: /80: 52   (A lower score indicates greater disability.)    Objective:  Pain Level (Scale 0-10)   5/19/2021   At Rest 0/10   At worst 5/10     Pain Description  Date 5/19/2021   Location R index finger    Pain Quality Aching   Frequency intermittent     Pain is worst  daytime   Exacerbated by  movement   Relieved by rest   Progression improving     Edema (Circumference measured in cm)   5/19/2021 5/19/2021   Index finger L R   P1 6.2 8.0   PIP 6.0 7.7   P2 5.0 6.2     Sensation   WNL throughout all nerve distributions; per patient report    ROM  Index Finger 5/19/2021   AROM (PROM) R   MCP -7/69   PIP -24/45   DIP -2/21   DUNLAP       ROM  Pain Report: - none  + mild    ++ moderate    +++ severe   Wrist 5/19/2021   AROM (PROM) R   Extension 72   Flexion 51   RD 20   UD 26   Supination WNL   Pronation 72     Strength  contraindicated    Assessment:  Patient presents with symptoms consistent with diagnosis of right index finger proximal phalanx fracture,  with surgical  intervention.     Patient's limitations or Problem List includes:  Pain, Decreased ROM/motion, Increased edema, Weakness and Sensory disturbance of the right index finger which interferes with the patient's ability to perform Self Care Tasks (dressing, eating, bathing, hygiene/toileting), Work Tasks, Sleep Patterns, Recreational Activities, Household Chores and Driving  as compared to previous level of function.    Rehab Potential:  Excellent - Return to full activity, no limitations    Patient will benefit from skilled Occupational Therapy to increase ROM and overall strength and decrease pain, edema and adherence of scarring to return to previous activity level and resume normal daily tasks and to reach their rehab potential.    Barriers to Learning:  No barrier    Communication Issues:  Patient appears to be able to clearly communicate and understand verbal and written communication and follow directions correctly.    Chart Review: Chart Review and Simple history review with patient    Identified Performance Deficits: bathing/showering, toileting, dressing, feeding, hygiene and grooming, driving and community mobility, health management and maintenance, home establishment and management, meal preparation and cleanup, leisure activities and football    Assessment of Occupational Performance:  5 or more Performance Deficits    Clinical Decision Making (Complexity): Low complexity    Treatment Explanation:  The following has been discussed with the patient:  RX ordered/plan of care  Anticipated outcomes  Possible risks and side effects    Plan:  Frequency:  1 X week, once  daily  Duration:  for 6 weeks    Treatment Plan:   Modalities:  US and Paraffin  Therapeutic Exercise:  AROM, AAROM, PROM, Tendon Gliding, Blocking, Isotonics, Isometrics and Stabilization  Neuromuscular re-education:  Coordination/Dexterity, Desensitization, Kinesthetic Training and Proprioceptive Training  Manual Techniques:  Joint mobilization, Scar mobilization and Myofascial release  Orthotic Fabrication:  Static orthosis  Self Care:  Self Care Tasks    Discharge Plan:  Achieve all LTG.  Independent in home treatment program.  Reach maximal therapeutic benefit.    Home Exercise Program:  Forearm based radial gutter orthosis including index and long, DIPs free  AROM of wrist and fingers  icing    Next Visit:  Pt. To be seen in [place]  Orthosis modifications  AROM  Scar mgmt

## 2021-05-19 NOTE — NURSING NOTE
Reason For Visit:   Chief Complaint   Patient presents with     Surgical Followup     1 wk pop DOS 5/12/21 Right Index Finger Open Reduction Internal Fixation  with         Primary MD: Keri Maldonado  Ref. MD:      Age: 21 year old    ?  No      There were no vitals taken for this visit.      Pain Assessment  Patient Currently in Pain: No               QuickDASH Assessment  No flowsheet data found.       Current Outpatient Medications   Medication Sig Dispense Refill     acetaminophen (TYLENOL) 500 MG tablet Take 500 mg by mouth once       FLUoxetine (PROZAC) 20 MG capsule Take 1 capsule (20 mg) by mouth daily 30 capsule 0     meloxicam (MOBIC) 15 MG tablet TAKE 1 TABLET BY MOUTH EVERY DAY 21 tablet 0     oxyCODONE (ROXICODONE) 5 MG tablet Take 1-2 tablets (5-10 mg) by mouth every 4 hours as needed for moderate to severe pain 6 tablet 0     sertraline (ZOLOFT) 50 MG tablet Take 1.5 tablets (75 mg) by mouth daily 45 tablet 1     sertraline (ZOLOFT) 50 MG tablet TAKE 1/2 TABLET BY MOUTH EVERY MORNING FOR 5 DAYS THEN 1 TABLET DAILY THEREAFTER 30 tablet 0       No Known Allergies    Asya Bonilla, ATC

## 2021-05-27 ENCOUNTER — THERAPY VISIT (OUTPATIENT)
Dept: OCCUPATIONAL THERAPY | Facility: CLINIC | Age: 21
End: 2021-05-27
Payer: COMMERCIAL

## 2021-05-27 DIAGNOSIS — M25.641 STIFFNESS OF FINGER JOINT OF RIGHT HAND: ICD-10-CM

## 2021-05-27 DIAGNOSIS — Z47.89 AFTERCARE FOLLOWING SURGERY OF THE MUSCULOSKELETAL SYSTEM: ICD-10-CM

## 2021-05-27 DIAGNOSIS — S62.618P: ICD-10-CM

## 2021-05-27 PROCEDURE — 97140 MANUAL THERAPY 1/> REGIONS: CPT | Mod: GO | Performed by: OCCUPATIONAL THERAPIST

## 2021-05-27 PROCEDURE — 97110 THERAPEUTIC EXERCISES: CPT | Mod: GO | Performed by: OCCUPATIONAL THERAPIST

## 2021-05-27 NOTE — PROGRESS NOTES
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  Will be returning to this clinic on 6/16 for follow-up with MD.      Jose Jenkins is a 21 year old male.    Patient reports symptoms of the right index finger which occurred due to playing football, helmet hit finger.  Current occupation is student, kinesiolgy  Job Tasks: Computer Work, Lifting, Carrying, Pushing, Pulling        S:  Subjective changes as noted by patient: The finger is doing OK. The splint is fitting fine.  Functional changes noted by patient: no changes, still in splint      O:  Pain Level (Scale 0-10)   5/19/2021 5/27/21   At Rest 0/10 0/10   At worst 5/10 1/10     Pain Description  Date 5/19/2021   Location R index finger    Pain Quality Aching   Frequency intermittent     Pain is worst  daytime   Exacerbated by  movement   Relieved by rest   Progression improving     Edema (Circumference measured in cm)   5/19/2021 5/19/2021 5/27/   Index finger L R R   P1 6.2 8.0 7.4   PIP 6.0 7.7 7.3   P2 5.0 6.2 5.5     Sensation   WNL throughout all nerve distributions; per patient report    ROM  Index Finger 5/19/2021 5/27/21   AROM (PROM) R R   MCP -7/69 0/80   PIP -24/45 -30/50   DIP -2/21 -5/25   DUNLAP       ROM  Pain Report: - none  + mild    ++ moderate    +++ severe   Wrist 5/19/2021 5/27/21   AROM (PROM) R R   Extension 72 75   Flexion 51 60   RD 20 15   UD 26 30   Supination WNL    Pronation 72      Strength  contraindicated    Please refer to the daily flowsheet for treatment provided today.     Home Exercise Program:  Forearm based radial gutter orthosis including index and long, DIPs free  AROM of wrist and fingers  icing  Scar massage  Coban for swelling    Next Visit:   Patient to be seen after MD  followup on 6/16/21 at [facility]  Orthosis modifications  AROM  Scar mgmt  Check edema

## 2021-06-01 ENCOUNTER — TELEPHONE (OUTPATIENT)
Dept: ORTHOPEDICS | Facility: CLINIC | Age: 21
End: 2021-06-01

## 2021-06-01 DIAGNOSIS — F41.9 ANXIETY: ICD-10-CM

## 2021-06-10 ENCOUNTER — DOCUMENTATION ONLY (OUTPATIENT)
Dept: FAMILY MEDICINE | Facility: CLINIC | Age: 21
End: 2021-06-10

## 2021-06-10 NOTE — PROGRESS NOTES
"[facility] ATHLETIC MEDICINE  [facility]   Sport Psychology Progress Note      Location of Visit: [facility] - Due to COVID-19 pandemic, this appointment was conducted via telehealth services.  Date of Visit: 6/10/21  Duration of Session: 30 minutes  Referred by: teammate/friend    Self-reported Concerns/Symptoms:  Anxiety/worry; Depression; Sleep disruption; Weight loss; feeling \"not myself\".    Suicide and Risk Assessment:  Recent suicidal thoughts: Yes: Ct reports that he has thoughts of \"this is getting exhausting\" and \"what would happen if I ?\" However, notes that these thoughts are much less intense or frequent than they used to be. He reports not being \"too bothered\" by them when they come.   Past suicidal thoughts: Yes: Ct reports \"some\" helpless/hopeless thoughts in 9th/10th grade, but that his recent thoughts of helplessness feel \"cranked-up.\"  Recent homicidal thoughts: No  Any attempts in the past: No  Any family/friends/loved ones die by suicide: No  Plan or considering various methods: No - Ct denies intent, plan or means and states he would not act on his thoughts. Ct states reasons for living being \"for my family and the people I love. I'm a [employer] and there are things I want to do.\"  Access to guns: No  Protective factors: no h/o suicide attempt, no plan or intent, no h/o risky impulsive behavior, no access to lethal means, h/o seeking help when needed, future oriented, none to minimal alcohol use , commitment to family, Orthodox beliefs and stable housing  Verbal contract for safety: Yes    Jose denies current urges to self-harm, homicidal ideation, suicidal ideation, means, plans, or intent.    Mental Status & Observations:  Jose appeared generally alert and oriented. Dress was appropriate to the weather and occasion. Grooming and hygiene were appropriate. Eye contact was good. Speech was of normal volume and normal. Thought " processes were relevant, logical and goal-directed. Thought content was within normal limits with no evidence of psychotic or paranoid features. Memory appeared intact. He exhibited no fidgety motor activity during the appointment, as he often does. Mood was appropriate with congruent affect. Insight and judgment appeared age appropriate with good focus in session.  Behavior was candid, cooperative and open    Jose reports noticing panic attacks, rumination, anxious catastrophic thoughts, low appetite, shakiness/restlessness and feelings of tension. He reports that he has been taking Prozac 20 mg for 1 month (Dr. Hull) and had a follow-up next week. His finger injury is recovering. He is beginning school and football at [employer] this month and has plans to meet with a mental health professional within Athletics. He is aware of campus counseling services as well.     Intervention:  Assessed safety and current depression/anxiety symptoms. Discussed transition of care, follow-up with Dr. Hull for medication management and getting scheduled with a mental health professional in [employer] at [facility], which he states he has already inquired about and is waiting to get scheduled. Emphasized the importance of participating in counseling and medication compliance. Reflected on work together, progress in therapy, and goals for his coming year with a new mental health provider.     Clinical Impressions:  296.22 (F32.1) Major Depressive Disorder, single episode, moderate-severe with anxious distress  300.02 (F41.1) Generalized Anxiety Disorder    Therapy objectives/goals:  Decrease anxiety symptoms  Decrease depressive symptoms  Decrease perceived stress  Enhance self-care  Increase willingness to be vulnerable and experience emotions  Improve mood  Improve sleep  Provide support  Teach and improve coping skills    Therapy follow-up plan:  Participate in counseling at [facility] campus counseling center or  Athletics mental health professional  Follow-up with sports medicine physician for medication management    Jerod Martinez, PhD LP, CMPC

## 2021-06-14 DIAGNOSIS — S62.618A: Primary | ICD-10-CM

## 2021-06-16 ENCOUNTER — THERAPY VISIT (OUTPATIENT)
Dept: OCCUPATIONAL THERAPY | Facility: CLINIC | Age: 21
End: 2021-06-16
Payer: COMMERCIAL

## 2021-06-16 ENCOUNTER — ANCILLARY PROCEDURE (OUTPATIENT)
Dept: GENERAL RADIOLOGY | Facility: CLINIC | Age: 21
End: 2021-06-16
Attending: ORTHOPAEDIC SURGERY
Payer: COMMERCIAL

## 2021-06-16 ENCOUNTER — OFFICE VISIT (OUTPATIENT)
Dept: ORTHOPEDICS | Facility: CLINIC | Age: 21
End: 2021-06-16
Payer: COMMERCIAL

## 2021-06-16 DIAGNOSIS — S62.618D: Primary | ICD-10-CM

## 2021-06-16 DIAGNOSIS — M25.649 FINGER STIFFNESS: ICD-10-CM

## 2021-06-16 DIAGNOSIS — Z47.89 AFTERCARE FOLLOWING SURGERY OF THE MUSCULOSKELETAL SYSTEM: ICD-10-CM

## 2021-06-16 DIAGNOSIS — S62.618A: ICD-10-CM

## 2021-06-16 PROCEDURE — 99024 POSTOP FOLLOW-UP VISIT: CPT | Mod: GC | Performed by: ORTHOPAEDIC SURGERY

## 2021-06-16 PROCEDURE — 97110 THERAPEUTIC EXERCISES: CPT | Mod: GO | Performed by: OCCUPATIONAL THERAPIST

## 2021-06-16 PROCEDURE — 97763 ORTHC/PROSTC MGMT SBSQ ENC: CPT | Mod: GO | Performed by: OCCUPATIONAL THERAPIST

## 2021-06-16 PROCEDURE — 97140 MANUAL THERAPY 1/> REGIONS: CPT | Mod: GO | Performed by: OCCUPATIONAL THERAPIST

## 2021-06-16 PROCEDURE — 73140 X-RAY EXAM OF FINGER(S): CPT | Mod: RT | Performed by: RADIOLOGY

## 2021-06-16 NOTE — PROGRESS NOTES
ORTHOPEDIC HAND SURGERY FOLLOW UP VISIT    REASON FOR VISIT: right IF proximal phalanx ORIF 5/12/2021    SUBJECTIVE:  Jose returns in interval follow-up.  He is now little over a month out from his right IF proximal phalanx ORIF.  He has been doing gentle range of motion exercises, and doing some light weights.  He is not stressing of the index finger at this point.  He is continuing to wear his forearm-based radial gutter splint.  He feels like his PIP and MP joints are somewhat tight.  He is wondering when he can advance his activities.    OBJECTIVE:  Exam  There were no vitals taken for this visit.   Gen: awake, alert, no acute distress  Resp: NLB on RA  CV: Skin wwp  Right Upper Extremity  - Skin intact. Well healed surgical scar right IF.  - Palpable radial pulse  - Fires epl, fpl, io  - SILT median, radial, ulnar nerve distributions  - Mild tenderness palpation over the proximal phalanx of the index finger  - Full MP range of motion of the index finger. Active IF PIP ROM 25-60 degrees with slight give at either end of range of motion  - Fires FDP and FDS to IF    IMAGING:  Right IF XR:  -Stable alignment of fracture and hardware in index finger proximal phalanx.    ASSESSMENT:  21M s/p right IF proximal phalanx ORIF 5/12/2021. Doing well postoperatively.     We discussed that the stiffness he is having in his right index finger PIP joint is expected at this point.  We discussed exercises he can do to work on range of motion.  We will have him work with a hand therapist in [place] to continue to focus on this in the coming weeks.  We will have him follow-up for the fall football season.    PLAN:  - Hand OT -- work on ROM of right IF  - Okay to advance to WB activities of right hand  - Follow up before the fall football season with repeat XR of right IF    The patient was seen and discussed with Dr. Hoover.    Jose L Garland MD  PGY-4  Orthopaedic Surgery      Patient was seen and examined with the  resident.  I agree with the assessment and plan of care.

## 2021-06-16 NOTE — PROGRESS NOTES
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  Will be returning to this clinic on 6/16 for follow-up with MD.    Reporting period is 5/27/21 to 6/16/2021    Subjective:   Subjective changes noted by patient:  \"It's been getting better.\"  Functional changes noted by patient:  Improvement in Self Care Tasks (dressing, eating, bathing, hygiene/toileting), Work Tasks, Sleep Patterns, Recreational Activities, Household Chores and Driving   Patient has noted adverse reaction to:  None    Objective:  Edema (Circumference measured in cm)   5/19/2021 5/19/2021 5/27/21 6/16/21   Index finger L R R    P1 6.2 8.0 7.4 6.8   PIP 6.0 7.7 7.3 6.6   P2 5.0 6.2 5.5 5.3     Sensation   WNL throughout all nerve distributions; per patient report    ROM  Index Finger 5/19/2021 5/27/21 6/16/21   AROM (PROM) R R    MCP -7/69 0/80 0/84   PIP -24/45 -30/50 -26/58   DIP -2/21 -5/25 0/30   DUNLAP 102 120 146   Please refer to the daily flowsheet for treatment provided today.     Assessment:  Response to therapy has been improvement to:  ROM of Fingers: All Planes  Edema:  less brawny edema    Overall Assessment:  Patient's symptoms are resolving.  STG/LTG:  STGoals have been reviewed and progress or achievement has occurred;  see goal sheet for details and updates.    Plan:  Frequency/Duration:  Pt. To follow-up in [place]  Appropriateness of Rx I have re-evaluated this patient and find that the nature, scope, duration and intensity of the therapy is appropriate for the medical condition of the patient.  Recommendations for Continued Therapy  Additions to Treatment Plan -  Orthotic Fabrication:  Static orthosis    Home Exercise Program:  Volar gutter for sleeping  AROM of wrist and fingers  Flexion straws  icing  Scar " massage  Coban for swelling

## 2021-06-16 NOTE — NURSING NOTE
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  No      There were no vitals taken for this visit.      Pain Assessment  Patient Currently in Pain: Denies               QuickDASH Assessment  No flowsheet data found.       Current Outpatient Medications   Medication Sig Dispense Refill     acetaminophen (TYLENOL) 500 MG tablet Take 500 mg by mouth once       FLUoxetine (PROZAC) 20 MG capsule Take 1 capsule (20 mg) by mouth daily 30 capsule 0     meloxicam (MOBIC) 15 MG tablet TAKE 1 TABLET BY MOUTH EVERY DAY 21 tablet 0     oxyCODONE (ROXICODONE) 5 MG tablet Take 1-2 tablets (5-10 mg) by mouth every 4 hours as needed for moderate to severe pain 6 tablet 0     sertraline (ZOLOFT) 50 MG tablet Take 1.5 tablets (75 mg) by mouth daily 45 tablet 1     sertraline (ZOLOFT) 50 MG tablet TAKE 1/2 TABLET BY MOUTH EVERY MORNING FOR 5 DAYS THEN 1 TABLET DAILY THEREAFTER 30 tablet 0       No Known Allergies    Char Hargrove LPN

## 2021-06-16 NOTE — LETTER
6/16/2021         RE: Jose Jenkins  [address] [address] [address]        Dear Colleague,    Thank you for referring your patient, Jose Jenkins, to the [facility] [place]. Please see a copy of my visit note below.    ORTHOPEDIC HAND SURGERY FOLLOW UP VISIT    REASON FOR VISIT: right IF proximal phalanx ORIF 5/12/2021    SUBJECTIVE:  Jose returns in interval follow-up.  He is now little over a month out from his right IF proximal phalanx ORIF.  He has been doing gentle range of motion exercises, and doing some light weights.  He is not stressing of the index finger at this point.  He is continuing to wear his forearm-based radial gutter splint.  He feels like his PIP and MP joints are somewhat tight.  He is wondering when he can advance his activities.    OBJECTIVE:  Exam  There were no vitals taken for this visit.   Gen: awake, alert, no acute distress  Resp: NLB on RA  CV: Skin wwp  Right Upper Extremity  - Skin intact. Well healed surgical scar right IF.  - Palpable radial pulse  - Fires epl, fpl, io  - SILT median, radial, ulnar nerve distributions  - Mild tenderness palpation over the proximal phalanx of the index finger  - Full MP range of motion of the index finger. Active IF PIP ROM 25-60 degrees with slight give at either end of range of motion  - Fires FDP and FDS to IF    IMAGING:  Right IF XR:  -Stable alignment of fracture and hardware in index finger proximal phalanx.    ASSESSMENT:  21M s/p right IF proximal phalanx ORIF 5/12/2021. Doing well postoperatively.     We discussed that the stiffness he is having in his right index finger PIP joint is expected at this point.  We discussed exercises he can do to work on range of motion.  We will have him work with a hand therapist in [place] to continue to focus on this in the coming weeks.  We will have him follow-up for the fall football season.    PLAN:  - Hand OT -- work on ROM of right IF  - Okay to  advance to WB activities of right hand  - Follow up before the fall football season with repeat XR of right IF    The patient was seen and discussed with Dr. Hoover.    Jose L Garland MD  PGY-4  Orthopaedic Surgery

## 2021-07-07 DIAGNOSIS — F41.9 ANXIETY: ICD-10-CM

## 2021-07-19 DIAGNOSIS — S62.618D: Primary | ICD-10-CM

## 2021-07-21 ENCOUNTER — OFFICE VISIT (OUTPATIENT)
Dept: ORTHOPEDICS | Facility: CLINIC | Age: 21
End: 2021-07-21
Payer: COMMERCIAL

## 2021-07-21 ENCOUNTER — ANCILLARY PROCEDURE (OUTPATIENT)
Dept: GENERAL RADIOLOGY | Facility: CLINIC | Age: 21
End: 2021-07-21
Attending: ORTHOPAEDIC SURGERY
Payer: COMMERCIAL

## 2021-07-21 DIAGNOSIS — S62.618D: ICD-10-CM

## 2021-07-21 DIAGNOSIS — S62.618D: Primary | ICD-10-CM

## 2021-07-21 PROCEDURE — 73140 X-RAY EXAM OF FINGER(S): CPT | Mod: RT | Performed by: RADIOLOGY

## 2021-07-21 PROCEDURE — 99024 POSTOP FOLLOW-UP VISIT: CPT | Performed by: ORTHOPAEDIC SURGERY

## 2021-07-21 NOTE — NURSING NOTE
Reason For Visit:   Chief Complaint   Patient presents with     RECHECK     Right index finger follow up       Primary MD: Keri Maldonado    Age: 21 year old    ?  No      There were no vitals taken for this visit.      Pain Assessment  Patient Currently in Pain: Denies               QuickDASH Assessment  No flowsheet data found.       Current Outpatient Medications   Medication Sig Dispense Refill     FLUoxetine (PROZAC) 20 MG capsule Take 1 capsule (20 mg) by mouth daily 30 capsule 1     meloxicam (MOBIC) 15 MG tablet TAKE 1 TABLET BY MOUTH EVERY DAY 21 tablet 0     sertraline (ZOLOFT) 50 MG tablet Take 1.5 tablets (75 mg) by mouth daily 45 tablet 1     sertraline (ZOLOFT) 50 MG tablet TAKE 1/2 TABLET BY MOUTH EVERY MORNING FOR 5 DAYS THEN 1 TABLET DAILY THEREAFTER 30 tablet 0     acetaminophen (TYLENOL) 500 MG tablet Take 500 mg by mouth once (Patient not taking: Reported on 7/21/2021)       oxyCODONE (ROXICODONE) 5 MG tablet Take 1-2 tablets (5-10 mg) by mouth every 4 hours as needed for moderate to severe pain (Patient not taking: Reported on 7/21/2021) 6 tablet 0       No Known Allergies    Criss Mars, ATC

## 2021-07-21 NOTE — LETTER
7/21/2021         RE: Jose Jenkins  [address] [place] [address]        Dear Colleague,    Thank you for referring your patient, Jose Jenkins, to the [facility] [place]. Please see a copy of my visit note below.    Jose is here for follow-up of his right index finger.  He is doing very well.  Therapy is progressing nicely.  No pain, no fevers, no chills.    The past medical history was reviewed and documented in the chart.  This includes medications, surgeries, social history as well as review of systems.    Physical examination of the right hand demonstrates nicely healed incision.  His motion at the MP joint, PIP joint, and DIP joint have improved significantly.  There is no malrotation on flexion.  There is no tenderness to palpation along the proximal phalanx, no instability, no crepitance.  Bilaterally, no motor, no sensory deficits are noted.  No significant atrophy.  There is brisk capillary refill in all digits and a palpable radial pulse.  No overlying skin changes noted.    X-rays are taken today, 3 views of the right index finger, AP, lateral, oblique.  Proximal phalanx is healing nicely, normal anatomic alignment.  Hardware is in the appropriate position, no signs of any loosening.    Impression: Status post ORIF right index finger proximal phalanx    Plan: He is doing great.  He can continue working on motion, strengthening.  He will finish out his course of therapy.  He was given a new referral to therapy.  He is doing that up at school.  He can return to sports, full activity.  I did complete paperwork for that.  We discussed the possibility of hardware removal.  I would not recommend that currently.  If the hardware becomes painful down the road we could consider hardware removal.  We will have him follow-up on an as-needed basis, he has my contact information.    Gary Hoover MD

## 2021-07-21 NOTE — PROGRESS NOTES
Jose is here for follow-up of his right index finger.  He is doing very well.  Therapy is progressing nicely.  No pain, no fevers, no chills.    The past medical history was reviewed and documented in the chart.  This includes medications, surgeries, social history as well as review of systems.    Physical examination of the right hand demonstrates nicely healed incision.  His motion at the MP joint, PIP joint, and DIP joint have improved significantly.  There is no malrotation on flexion.  There is no tenderness to palpation along the proximal phalanx, no instability, no crepitance.  Bilaterally, no motor, no sensory deficits are noted.  No significant atrophy.  There is brisk capillary refill in all digits and a palpable radial pulse.  No overlying skin changes noted.    X-rays are taken today, 3 views of the right index finger, AP, lateral, oblique.  Proximal phalanx is healing nicely, normal anatomic alignment.  Hardware is in the appropriate position, no signs of any loosening.    Impression: Status post ORIF right index finger proximal phalanx    Plan: He is doing great.  He can continue working on motion, strengthening.  He will finish out his course of therapy.  He was given a new referral to therapy.  He is doing that up at school.  He can return to sports, full activity.  I did complete paperwork for that.  We discussed the possibility of hardware removal.  I would not recommend that currently.  If the hardware becomes painful down the road we could consider hardware removal.  We will have him follow-up on an as-needed basis, he has my contact information.

## (undated) DEVICE — CAST PADDING 4" UNSTERILE 9044

## (undated) DEVICE — SU ETHILON 5-0 P-3 18" BLACK 698G

## (undated) DEVICE — PREP SKIN SCRUB TRAY 4461A

## (undated) DEVICE — DRAPE STOCKINETTE 4" 8544

## (undated) DEVICE — LINEN GOWN XLG 5407

## (undated) DEVICE — ESU HOLSTER PLASTIC DISP E2400

## (undated) DEVICE — Device

## (undated) DEVICE — SOL WATER IRRIG 500ML BOTTLE 2F7113

## (undated) DEVICE — BLADE KNIFE BEAVER MINI BEAVER6700

## (undated) DEVICE — SUCTION MANIFOLD NEPTUNE 2 SYS 4 PORT 0702-020-000

## (undated) DEVICE — CAST PADDING 4" STERILE 9044S

## (undated) DEVICE — CAST PADDING 2" UNSTERILE 9062

## (undated) DEVICE — BNDG ELASTIC 3"X5YDS UNSTERILE 6611-30

## (undated) DEVICE — DRAPE C-ARM OEC MINI VIEW 6800   00-901917-01

## (undated) DEVICE — PACK HAND CUSTOM ASC

## (undated) DEVICE — LINEN ORTHO PACK 5446

## (undated) DEVICE — CAST PADDING 3" UNSTERILE 9043

## (undated) DEVICE — GLOVE PROTEXIS POWDER FREE 8.0 ORTHOPEDIC 2D73ET80

## (undated) DEVICE — TOURNIQUET SGL BLADDER 18"X5.5" RED 5921-018-145

## (undated) DEVICE — SOL NACL 0.9% IRRIG 500ML BOTTLE 2F7123

## (undated) DEVICE — SLING ARM MED 79-99155

## (undated) DEVICE — SLING ARM LG 79-99157

## (undated) DEVICE — CAST PLASTER SPLINT 3X15" 7393

## (undated) DEVICE — BNDG ELASTIC 2"X5YDS UNSTERILE 6611-20

## (undated) DEVICE — PREP CHLORHEXIDINE 4% 4OZ (HIBICLENS) 57504

## (undated) DEVICE — DRAPE STERI TOWEL LG 1010

## (undated) RX ORDER — ACETAMINOPHEN 325 MG/1
TABLET ORAL
Status: DISPENSED
Start: 2021-05-12

## (undated) RX ORDER — KETOROLAC TROMETHAMINE 30 MG/ML
INJECTION, SOLUTION INTRAMUSCULAR; INTRAVENOUS
Status: DISPENSED
Start: 2021-05-12

## (undated) RX ORDER — GLYCOPYRROLATE 0.2 MG/ML
INJECTION INTRAMUSCULAR; INTRAVENOUS
Status: DISPENSED
Start: 2021-05-12

## (undated) RX ORDER — PROPOFOL 10 MG/ML
INJECTION, EMULSION INTRAVENOUS
Status: DISPENSED
Start: 2021-05-12

## (undated) RX ORDER — ONDANSETRON 2 MG/ML
INJECTION INTRAMUSCULAR; INTRAVENOUS
Status: DISPENSED
Start: 2021-05-12

## (undated) RX ORDER — GABAPENTIN 300 MG/1
CAPSULE ORAL
Status: DISPENSED
Start: 2021-05-12

## (undated) RX ORDER — LIDOCAINE HYDROCHLORIDE 10 MG/ML
INJECTION, SOLUTION EPIDURAL; INFILTRATION; INTRACAUDAL; PERINEURAL
Status: DISPENSED
Start: 2019-10-10

## (undated) RX ORDER — CEFAZOLIN SODIUM 2 G/50ML
SOLUTION INTRAVENOUS
Status: DISPENSED
Start: 2021-05-12